# Patient Record
Sex: FEMALE | Race: WHITE | NOT HISPANIC OR LATINO | Employment: OTHER | URBAN - METROPOLITAN AREA
[De-identification: names, ages, dates, MRNs, and addresses within clinical notes are randomized per-mention and may not be internally consistent; named-entity substitution may affect disease eponyms.]

---

## 2020-07-21 ENCOUNTER — OFFICE VISIT (OUTPATIENT)
Dept: FAMILY MEDICINE CLINIC | Facility: CLINIC | Age: 75
End: 2020-07-21

## 2020-07-21 VITALS
OXYGEN SATURATION: 98 % | TEMPERATURE: 95.8 F | HEART RATE: 75 BPM | SYSTOLIC BLOOD PRESSURE: 159 MMHG | WEIGHT: 123 LBS | HEIGHT: 64 IN | DIASTOLIC BLOOD PRESSURE: 78 MMHG | BODY MASS INDEX: 21 KG/M2

## 2020-07-21 DIAGNOSIS — S93.402A SPRAIN OF LEFT ANKLE, UNSPECIFIED LIGAMENT, INITIAL ENCOUNTER: ICD-10-CM

## 2020-07-21 DIAGNOSIS — B18.2 CHRONIC HEPATITIS C WITHOUT HEPATIC COMA (HCC): ICD-10-CM

## 2020-07-21 DIAGNOSIS — E03.9 ACQUIRED HYPOTHYROIDISM: ICD-10-CM

## 2020-07-21 DIAGNOSIS — I10 ESSENTIAL HYPERTENSION: Primary | ICD-10-CM

## 2020-07-21 DIAGNOSIS — E78.2 MIXED HYPERLIPIDEMIA: ICD-10-CM

## 2020-07-21 PROBLEM — R73.9 HYPERGLYCEMIA: Status: ACTIVE | Noted: 2020-07-21

## 2020-07-21 PROBLEM — M81.0 OSTEOPOROSIS: Status: ACTIVE | Noted: 2020-07-21

## 2020-07-21 PROBLEM — E87.5 HYPERKALEMIA: Status: ACTIVE | Noted: 2020-07-21

## 2020-07-21 PROBLEM — F17.200 SMOKER: Status: ACTIVE | Noted: 2020-07-21

## 2020-07-21 PROBLEM — E78.5 HYPERLIPEMIA: Status: ACTIVE | Noted: 2020-07-21

## 2020-07-21 PROBLEM — N63.0 BREAST LUMP: Status: ACTIVE | Noted: 2020-07-21

## 2020-07-21 PROBLEM — G47.00 INSOMNIA: Status: ACTIVE | Noted: 2020-07-21

## 2020-07-21 PROCEDURE — 99203 OFFICE O/P NEW LOW 30 MIN: CPT | Performed by: FAMILY MEDICINE

## 2020-07-21 RX ORDER — LEVOTHYROXINE SODIUM 0.05 MG/1
50 TABLET ORAL DAILY
COMMUNITY
End: 2020-07-21 | Stop reason: SDUPTHER

## 2020-07-21 RX ORDER — LEVOTHYROXINE SODIUM 0.05 MG/1
50 TABLET ORAL DAILY
Qty: 90 TABLET | Refills: 3 | Status: SHIPPED | OUTPATIENT
Start: 2020-07-21 | End: 2021-07-12 | Stop reason: SDUPTHER

## 2020-07-21 RX ORDER — METOPROLOL SUCCINATE 25 MG/1
25 TABLET, EXTENDED RELEASE ORAL DAILY
COMMUNITY
End: 2020-07-21 | Stop reason: SDUPTHER

## 2020-07-21 RX ORDER — METOPROLOL SUCCINATE 25 MG/1
25 TABLET, EXTENDED RELEASE ORAL DAILY
Qty: 90 TABLET | Refills: 3 | Status: SHIPPED | OUTPATIENT
Start: 2020-07-21 | End: 2021-07-12 | Stop reason: SDUPTHER

## 2020-07-21 NOTE — PROGRESS NOTES
Subjective   Dorie Moctezuma is a 74 y.o. female.   Chief Complaint   Patient presents with   • Hypertension   • Fall       History of Present Illness     Transferring from Dr. Aguirre in [place].  No records are available prior to the beginning of the visit.  She tells me several times how much she loved her previous PCP.  Eventually, I put together that it is a bit difficult for her to travel to his office.  Patient presents to establish care, needing refills on bp and thyroid medication. Patient states she does not check her bp at home because she cannot find a cuff that gets a good reading.    No records from prior PCP.    Thinks last labs were about 1 year ago.      Last mammo was maybe a year ago.      B/P always a little high at [facility] Visit.      Patient states she fell last night and c/o left ankle pain. Patient states it swells off and on.  She is able to walk just fine.      Patient Active Problem List    Diagnosis Date Noted   • Hyperglycemia 07/21/2020   • Hyperkalemia 07/21/2020   • Mixed hyperlipidemia 07/21/2020   • Essential hypertension 07/21/2020   • Acquired hypothyroidism 07/21/2020   • Insomnia 07/21/2020   • Osteoporosis 07/21/2020   • Smoker 07/21/2020   • Breast lump 07/21/2020     Note Last Updated: 7/21/2020     H/o biopsy in past - was OK.     • Chronic hepatitis C without hepatic coma (CMS/HCC) 07/21/2020     Note Last Updated: 7/21/2020     Diagnosed in 1970's  Per patient - had tests that showed hepatitis A             Past Surgical History:   Procedure Laterality Date   • CHOLECYSTECTOMY       No current outpatient medications on file prior to visit.     No current facility-administered medications on file prior to visit.      Allergies   Allergen Reactions   • Benzocaine (Topical) Rash   • Latex, Natural Rubber Rash     Social History     Socioeconomic History   • Marital status: Single     Spouse name: Not on file   • Number of children: Not on file   • Years of education: Not on  "file   • Highest education level: Not on file   Tobacco Use   • Smoking status: Current Every Day Smoker   • Smokeless tobacco: Never Used     Family History   Problem Relation Age of Onset   • Other Sister         bladder cancer   • Heart failure Sister    • Crohn's disease Sister    • Colon cancer Sister    • Colon cancer Brother    • No Known Problems Brother      The following portions of the patient's history were reviewed and updated as appropriate: allergies, current medications, past family history, past medical history, past social history, past surgical history and problem list.    Review of Systems   Constitutional: Negative for chills and fever.   Respiratory: Negative for cough, choking and wheezing.    Cardiovascular: Negative for palpitations and leg swelling.   Gastrointestinal: Negative for abdominal pain.   Neurological: Negative for headache.       Objective   /78 (BP Location: Right arm, Patient Position: Sitting, Cuff Size: Adult)   Pulse 75   Temp 95.8 °F (35.4 °C) (Infrared)   Ht 162.6 cm (64\")   Wt 55.8 kg (123 lb)   SpO2 98%   BMI 21.11 kg/m²   Physical Exam   Constitutional: She is oriented to person, place, and time. She appears well-developed and well-nourished.   HENT:   Head: Normocephalic and atraumatic.   Eyes: Conjunctivae and EOM are normal.   Neck: Normal range of motion.   Cardiovascular: Normal rate, regular rhythm and normal heart sounds.   No murmur heard.  Pulmonary/Chest: Effort normal and breath sounds normal. No respiratory distress.   Musculoskeletal: Normal range of motion.   Neurological: She is alert and oriented to person, place, and time.   Skin: Skin is warm and dry. No rash noted.   Psychiatric: Her behavior is normal. Her affect is blunt.   Seems to have a very poor knowledge of her medical problems.  Even has sort of a difficult time even tell me why she changed doctors.       Assessment/Plan   Diagnoses and all orders for this visit:    1. Essential " hypertension (Primary)  -     Cancel: Comprehensive Metabolic Panel  -     Cancel: CBC & Differential  -     Comprehensive Metabolic Panel  -     CBC & Differential    2. Mixed hyperlipidemia  -     Cancel: Lipid Panel  -     Lipid Panel  -     CBC & Differential    3. Acquired hypothyroidism  -     Cancel: TSH  -     TSH    4. Chronic hepatitis C without hepatic coma (CMS/HCC)  -     Cancel: Hepatitis panel, acute  -     Hepatitis panel, acute    5. Sprain of left ankle, unspecified ligament, initial encounter    Other orders  -     levothyroxine (SYNTHROID, LEVOTHROID) 50 MCG tablet; Take 1 tablet by mouth Daily.  Dispense: 90 tablet; Refill: 3  -     metoprolol succinate XL (TOPROL-XL) 25 MG 24 hr tablet; Take 1 tablet by mouth Daily.  Dispense: 90 tablet; Refill: 3    Will refill her current medications at current doses.  We will have to obtain prior records for baseline of her ongoing medical problems before any meaningful decisions can be made.  We will go ahead and get labs today to evaluate her hypothyroidism.  Recheck her lipid panel, get a look at her blood sugar, kidney tests, liver tests.  Finally we will repeat a hepatitis panel due to this very vague history of hepatitis.  We will also request records to determine when her last mammogram was, get labs from previous work-ups.  We will see her back in a few weeks and make any changes as appropriate.  Finally, recommend she try to stay off her left ankle as much as possible, wrap it with an Ace wrap for support, use ice if it swells.         Return in about 4 weeks (around 8/18/2020).    Call with any problems or concerns before next visit

## 2020-07-22 LAB
ALBUMIN SERPL-MCNC: 5 G/DL (ref 3.7–4.7)
ALBUMIN/GLOB SERPL: 1.9 {RATIO} (ref 1.2–2.2)
ALP SERPL-CCNC: 90 IU/L (ref 39–117)
ALT SERPL-CCNC: 12 IU/L (ref 0–32)
AST SERPL-CCNC: 23 IU/L (ref 0–40)
BASOPHILS # BLD AUTO: 0.1 X10E3/UL (ref 0–0.2)
BASOPHILS NFR BLD AUTO: 1 %
BILIRUB SERPL-MCNC: 0.9 MG/DL (ref 0–1.2)
BUN SERPL-MCNC: 6 MG/DL (ref 8–27)
BUN/CREAT SERPL: 9 (ref 12–28)
CALCIUM SERPL-MCNC: 10.2 MG/DL (ref 8.7–10.3)
CHLORIDE SERPL-SCNC: 95 MMOL/L (ref 96–106)
CHOLEST SERPL-MCNC: 247 MG/DL (ref 100–199)
CO2 SERPL-SCNC: 24 MMOL/L (ref 20–29)
CREAT SERPL-MCNC: 0.64 MG/DL (ref 0.57–1)
EOSINOPHIL # BLD AUTO: 0 X10E3/UL (ref 0–0.4)
EOSINOPHIL NFR BLD AUTO: 0 %
ERYTHROCYTE [DISTWIDTH] IN BLOOD BY AUTOMATED COUNT: 12.4 % (ref 11.7–15.4)
GLOBULIN SER CALC-MCNC: 2.6 G/DL (ref 1.5–4.5)
GLUCOSE SERPL-MCNC: 100 MG/DL (ref 65–99)
HAV IGM SERPL QL IA: NEGATIVE
HBV CORE IGM SERPL QL IA: NEGATIVE
HBV SURFACE AG SERPL QL IA: NEGATIVE
HCT VFR BLD AUTO: 43.4 % (ref 34–46.6)
HCV AB S/CO SERPL IA: <0.1 S/CO RATIO (ref 0–0.9)
HDLC SERPL-MCNC: 84 MG/DL
HGB BLD-MCNC: 14.8 G/DL (ref 11.1–15.9)
IMM GRANULOCYTES # BLD AUTO: 0 X10E3/UL (ref 0–0.1)
IMM GRANULOCYTES NFR BLD AUTO: 0 %
LDLC SERPL CALC-MCNC: 150 MG/DL (ref 0–99)
LYMPHOCYTES # BLD AUTO: 2.2 X10E3/UL (ref 0.7–3.1)
LYMPHOCYTES NFR BLD AUTO: 22 %
MCH RBC QN AUTO: 31.5 PG (ref 26.6–33)
MCHC RBC AUTO-ENTMCNC: 34.1 G/DL (ref 31.5–35.7)
MCV RBC AUTO: 92 FL (ref 79–97)
MONOCYTES # BLD AUTO: 0.7 X10E3/UL (ref 0.1–0.9)
MONOCYTES NFR BLD AUTO: 7 %
NEUTROPHILS # BLD AUTO: 7 X10E3/UL (ref 1.4–7)
NEUTROPHILS NFR BLD AUTO: 70 %
PLATELET # BLD AUTO: 266 X10E3/UL (ref 150–450)
POTASSIUM SERPL-SCNC: 4.6 MMOL/L (ref 3.5–5.2)
PROT SERPL-MCNC: 7.6 G/DL (ref 6–8.5)
RBC # BLD AUTO: 4.7 X10E6/UL (ref 3.77–5.28)
SODIUM SERPL-SCNC: 133 MMOL/L (ref 134–144)
TRIGL SERPL-MCNC: 64 MG/DL (ref 0–149)
TSH SERPL DL<=0.005 MIU/L-ACNC: 0.87 UIU/ML (ref 0.45–4.5)
VLDLC SERPL CALC-MCNC: 13 MG/DL (ref 5–40)
WBC # BLD AUTO: 10.1 X10E3/UL (ref 3.4–10.8)

## 2020-08-18 ENCOUNTER — OFFICE VISIT (OUTPATIENT)
Dept: FAMILY MEDICINE CLINIC | Facility: CLINIC | Age: 75
End: 2020-08-18

## 2020-08-18 VITALS
TEMPERATURE: 98.9 F | HEIGHT: 64 IN | BODY MASS INDEX: 20.32 KG/M2 | DIASTOLIC BLOOD PRESSURE: 82 MMHG | HEART RATE: 67 BPM | WEIGHT: 119 LBS | OXYGEN SATURATION: 97 % | SYSTOLIC BLOOD PRESSURE: 140 MMHG

## 2020-08-18 DIAGNOSIS — R63.4 WEIGHT LOSS: ICD-10-CM

## 2020-08-18 DIAGNOSIS — I10 ESSENTIAL HYPERTENSION: Primary | ICD-10-CM

## 2020-08-18 DIAGNOSIS — B18.2 CHRONIC HEPATITIS C WITHOUT HEPATIC COMA (HCC): ICD-10-CM

## 2020-08-18 DIAGNOSIS — E03.9 ACQUIRED HYPOTHYROIDISM: ICD-10-CM

## 2020-08-18 DIAGNOSIS — E78.2 MIXED HYPERLIPIDEMIA: ICD-10-CM

## 2020-08-18 PROCEDURE — 99214 OFFICE O/P EST MOD 30 MIN: CPT | Performed by: FAMILY MEDICINE

## 2020-08-18 NOTE — PROGRESS NOTES
Subjective   Dorie Moctezuma is a 75 y.o. female.   No chief complaint on file.      History of Present Illness   Presents to the office today to follow-up on a visit a few weeks ago whereby she changed her primary care to this office.  We did not have any records at that time.  I did some labs.  Her viral hepatitis panel was negative leading me to question whether she ever had hepatitis C.  CMP showed a glucose of 100, sodium a little low at 133.  LFTs were normal.  Lipid panel showed a total cholesterol of 247 and an LDL of 150, TSH was normal at 0.87.  CBC was normal.  Labs going back to 2016 received.  Hepatitis C was negative.  Multiple CBCs, CMP, TSH, lipid panel.  Chronically low sodium.  Last Mammogram-April 23, 2019-BI-RADS 1.  Done at [facility].      1 old note from prior PCP received.  This is from April 7 of this year.  Chronic problem list reads history of hepatitis B, hyperglycemia, hyperkalemia, hyperlipidemia, hypertension, hypothyroidism, insomnia, osteoporosis, personal history of cervical dysplasia, PTSD.  Says she had a colonoscopy July 25, 2012.  She is status post cholecystectomy and some type of wrist surgery.  That is all that was in the old records.  She has no idea about osteoporosis, history of cervical dysplasia.  She tells me she does not know why that would be in there.    New c/o - no appetite.  Lost another 4 pounds since last visit.   Tells me that she just does not eat during the day.  She cannot describe any particular problem that prevents her from eating such as dysphagia, choking when eating, chest pain, belly pain, postprandial nausea, postprandial diarrhea.  She just does not eat.  She tells me she gets hungry at night and eats what ever she feels like at night.  She then goes on to explain that she is a very social person and prior to the pandemic would go out to various restaurants with her friends.  She describes going to a restaurant and eating large cheeseburger  and fries and this was 1 of her favorite things.  She asks if I can recommend some type of drink to help her support her weight.      Patient Active Problem List    Diagnosis Date Noted   • Hyperglycemia 07/21/2020   • Hyperkalemia 07/21/2020   • Mixed hyperlipidemia 07/21/2020   • Essential hypertension 07/21/2020   • Acquired hypothyroidism 07/21/2020   • Insomnia 07/21/2020   • Osteoporosis 07/21/2020   • Smoker 07/21/2020   • Breast lump 07/21/2020     Note Last Updated: 7/21/2020     H/o biopsy in past - was OK.     • Chronic hepatitis C without hepatic coma (CMS/HCC) 07/21/2020     Note Last Updated: 7/21/2020     Diagnosed in 1970's  Per patient - had tests that showed hepatitis A             Past Surgical History:   Procedure Laterality Date   • CHOLECYSTECTOMY       Current Outpatient Medications on File Prior to Visit   Medication Sig   • levothyroxine (SYNTHROID, LEVOTHROID) 50 MCG tablet Take 1 tablet by mouth Daily.   • metoprolol succinate XL (TOPROL-XL) 25 MG 24 hr tablet Take 1 tablet by mouth Daily.     No current facility-administered medications on file prior to visit.      Allergies   Allergen Reactions   • Benzocaine (Topical) Rash   • Latex, Natural Rubber Rash     Social History     Socioeconomic History   • Marital status: Single     Spouse name: Not on file   • Number of children: Not on file   • Years of education: Not on file   • Highest education level: Not on file   Tobacco Use   • Smoking status: Current Every Day Smoker   • Smokeless tobacco: Never Used     Family History   Problem Relation Age of Onset   • Other Sister         bladder cancer   • Heart failure Sister    • Crohn's disease Sister    • Colon cancer Sister    • Colon cancer Brother    • No Known Problems Brother      The following portions of the patient's history were reviewed and updated as appropriate: allergies, current medications, past family history, past medical history, past social history, past surgical history  "and problem list.    Review of Systems   Constitutional: Negative for chills and fever.   Respiratory: Negative for cough, shortness of breath and wheezing.    Cardiovascular: Negative for chest pain, palpitations and leg swelling.   Gastrointestinal: Negative for abdominal pain, nausea, vomiting, GERD and indigestion.   Endocrine: Negative for polydipsia, polyphagia and polyuria.   Genitourinary: Negative for dysuria.   Neurological: Negative for dizziness and headache.   Psychiatric/Behavioral: The patient is not nervous/anxious.        Objective   /82 (BP Location: Left arm, Patient Position: Sitting, Cuff Size: Adult)   Pulse 67   Temp 98.9 °F (37.2 °C) (Infrared)   Ht 162.6 cm (64\")   Wt 54 kg (119 lb)   SpO2 97%   BMI 20.43 kg/m²   Physical Exam   Constitutional: She is oriented to person, place, and time. She appears well-developed and well-nourished.   Wearing a face mask     HENT:   Head: Normocephalic and atraumatic.   Eyes: Conjunctivae and EOM are normal.   Neck: Normal range of motion.   Cardiovascular: Normal rate, regular rhythm and normal heart sounds.   No murmur heard.  Pulmonary/Chest: Effort normal and breath sounds normal. No respiratory distress.   Musculoskeletal: Normal range of motion.   Neurological: She is alert and oriented to person, place, and time.   Skin: Skin is warm and dry. No rash noted.   Psychiatric: She has a normal mood and affect. Her behavior is normal.         Office Visit on 07/21/2020   Component Date Value Ref Range Status   • TSH 07/21/2020 0.872  0.450 - 4.500 uIU/mL Final   • Total Cholesterol 07/21/2020 247* 100 - 199 mg/dL Final   • Triglycerides 07/21/2020 64  0 - 149 mg/dL Final   • HDL Cholesterol 07/21/2020 84  >39 mg/dL Final   • VLDL Cholesterol 07/21/2020 13  5 - 40 mg/dL Final   • LDL Cholesterol  07/21/2020 150* 0 - 99 mg/dL Final   • Glucose 07/21/2020 100* 65 - 99 mg/dL Final   • BUN 07/21/2020 6* 8 - 27 mg/dL Final   • Creatinine 07/21/2020 " 0.64  0.57 - 1.00 mg/dL Final   • eGFR Non  Am 07/21/2020 87  >59 mL/min/1.73 Final   • eGFR African Am 07/21/2020 100  >59 mL/min/1.73 Final   • BUN/Creatinine Ratio 07/21/2020 9* 12 - 28 Final   • Sodium 07/21/2020 133* 134 - 144 mmol/L Final   • Potassium 07/21/2020 4.6  3.5 - 5.2 mmol/L Final   • Chloride 07/21/2020 95* 96 - 106 mmol/L Final   • Total CO2 07/21/2020 24  20 - 29 mmol/L Final   • Calcium 07/21/2020 10.2  8.7 - 10.3 mg/dL Final   • Total Protein 07/21/2020 7.6  6.0 - 8.5 g/dL Final   • Albumin 07/21/2020 5.0* 3.7 - 4.7 g/dL Final   • Globulin 07/21/2020 2.6  1.5 - 4.5 g/dL Final   • A/G Ratio 07/21/2020 1.9  1.2 - 2.2 Final   • Total Bilirubin 07/21/2020 0.9  0.0 - 1.2 mg/dL Final   • Alkaline Phosphatase 07/21/2020 90  39 - 117 IU/L Final   • AST (SGOT) 07/21/2020 23  0 - 40 IU/L Final   • ALT (SGPT) 07/21/2020 12  0 - 32 IU/L Final   • WBC 07/21/2020 10.1  3.4 - 10.8 x10E3/uL Final   • RBC 07/21/2020 4.70  3.77 - 5.28 x10E6/uL Final   • Hemoglobin 07/21/2020 14.8  11.1 - 15.9 g/dL Final   • Hematocrit 07/21/2020 43.4  34.0 - 46.6 % Final   • MCV 07/21/2020 92  79 - 97 fL Final   • MCH 07/21/2020 31.5  26.6 - 33.0 pg Final   • MCHC 07/21/2020 34.1  31.5 - 35.7 g/dL Final   • RDW 07/21/2020 12.4  11.7 - 15.4 % Final   • Platelets 07/21/2020 266  150 - 450 x10E3/uL Final   • Neutrophil Rel % 07/21/2020 70  Not Estab. % Final   • Lymphocyte Rel % 07/21/2020 22  Not Estab. % Final   • Monocyte Rel % 07/21/2020 7  Not Estab. % Final   • Eosinophil Rel % 07/21/2020 0  Not Estab. % Final   • Basophil Rel % 07/21/2020 1  Not Estab. % Final   • Neutrophils Absolute 07/21/2020 7.0  1.4 - 7.0 x10E3/uL Final   • Lymphocytes Absolute 07/21/2020 2.2  0.7 - 3.1 x10E3/uL Final   • Monocytes Absolute 07/21/2020 0.7  0.1 - 0.9 x10E3/uL Final   • Eosinophils Absolute 07/21/2020 0.0  0.0 - 0.4 x10E3/uL Final   • Basophils Absolute 07/21/2020 0.1  0.0 - 0.2 x10E3/uL Final   • Immature Granulocyte Rel %  07/21/2020 0  Not Estab. % Final   • Immature Grans Absolute 07/21/2020 0.0  0.0 - 0.1 x10E3/uL Final   • Hep A IgM 07/21/2020 Negative  Negative Final   • Hepatitis B Surface Ag 07/21/2020 Negative  Negative Final   • Hep B Core IgM 07/21/2020 Negative  Negative Final   • Hep C Virus Ab 07/21/2020 <0.1  0.0 - 0.9 s/co ratio Final    Comment:                                   Negative:     < 0.8                               Indeterminate: 0.8 - 0.9                                    Positive:     > 0.9   The CDC recommends that a positive HCV antibody result   be followed up with a HCV Nucleic Acid Amplification   test (119159).           Assessment/Plan   Diagnoses and all orders for this visit:    1. Essential hypertension (Primary)    2. Mixed hyperlipidemia    3. Chronic hepatitis C without hepatic coma (CMS/HCC)    4. Acquired hypothyroidism    5. Weight loss    Her blood pressure seems fairly well controlled.  Cholesterol is elevated.  She is not on a statin.  At 75, it is questionable as to whether that is even helpful.  Hepatitis panel is negative, questioning her history of hepatitis B.  Finally her thyroid is balanced on her current labs.  Recommend Boost or Ensure TID.    If her weight does not stabilize, we can consider some type of appetite stimulating medication.  I do wonder if she is getting a bit depressed because she has been isolated from her friends.  I did recommend we repeat her mammogram, but she does not want to do that now.  She tells me that they always see something and it is always fine and they always want her to come back and do more tests and she just does not want to do that right now.  I asked her to come back soon so I could recheck her weight.  She tells me she is not used to coming to the doctor so often.  She finally agreed to come back in 3 months.  If her weight is stable and she is feeling okay, I think it is fine to cut her appointments back to once or twice a year.  We will  see if we can round up some records to fill in any details about this report history of osteoporosis.           Return in about 3 months (around 11/18/2020).    Call with any problems or concerns before next visit

## 2020-09-02 ENCOUNTER — TELEPHONE (OUTPATIENT)
Dept: FAMILY MEDICINE CLINIC | Facility: CLINIC | Age: 75
End: 2020-09-02

## 2020-09-02 NOTE — TELEPHONE ENCOUNTER
Yes, she can take an over-the-counter antihistamine.  I would recommend Allegra 60 mg, 1 pill twice daily for as long as she is having the stuffy and runny nose.  Thanks

## 2020-09-02 NOTE — TELEPHONE ENCOUNTER
Patient called wanting to know if its okay to take otc allergy medication said she is experiencing  stuffy/runny nose or if  could send something in

## 2020-11-18 ENCOUNTER — OFFICE VISIT (OUTPATIENT)
Dept: FAMILY MEDICINE CLINIC | Facility: CLINIC | Age: 75
End: 2020-11-18

## 2020-11-18 VITALS
OXYGEN SATURATION: 98 % | WEIGHT: 120.8 LBS | BODY MASS INDEX: 20.62 KG/M2 | HEIGHT: 64 IN | TEMPERATURE: 97.1 F | SYSTOLIC BLOOD PRESSURE: 165 MMHG | DIASTOLIC BLOOD PRESSURE: 90 MMHG | HEART RATE: 64 BPM

## 2020-11-18 DIAGNOSIS — R63.4 WEIGHT LOSS: ICD-10-CM

## 2020-11-18 DIAGNOSIS — F41.1 GENERALIZED ANXIETY DISORDER: ICD-10-CM

## 2020-11-18 DIAGNOSIS — I10 ESSENTIAL HYPERTENSION: Primary | ICD-10-CM

## 2020-11-18 PROCEDURE — 99213 OFFICE O/P EST LOW 20 MIN: CPT | Performed by: FAMILY MEDICINE

## 2020-11-18 NOTE — PROGRESS NOTES
Subjective   Dorie Moctezuma is a 75 y.o. female.   Chief Complaint   Patient presents with   • Hypertension   • Weight Loss       History of Present Illness   Patient here today to follow up for hypertension and weight loss. She doesn't take her blood pressure at home, she states that would make her nervous. She states she has to make herself eat, she doesn't have much appetite. She is concerned about what time of day she takes her meds, wants to talk to doctor about that.   Above reviewed and verified.  Presents today to primarily recheck on weight loss.  Again, she has got questions about when to take her medicines.  She only takes metoprolol and Synthroid.  Her last TSH was in July and it was 0.872.  Other labs were discussed with her at the previous visit.  Her blood sugar was 100.  She does not have a history of diabetes.  She complains of ongoing weight loss.  She is 120, nearly 121 pounds today.  She was 119 pounds in August.  She reports that she has does not have an appetite.  She acknowledges that she does not eat and that she has not lost any more weight.  She denies any nausea or vomiting.  She is very anxious.  She does not like anything that is happening in the world right now.        Patient Active Problem List    Diagnosis Date Noted   • Hyperglycemia 07/21/2020   • Hyperkalemia 07/21/2020   • Mixed hyperlipidemia 07/21/2020   • Essential hypertension 07/21/2020   • Acquired hypothyroidism 07/21/2020   • Insomnia 07/21/2020   • Osteoporosis 07/21/2020   • Smoker 07/21/2020   • Breast lump 07/21/2020     Note Last Updated: 7/21/2020     H/o biopsy in past - was OK.     • Chronic hepatitis C without hepatic coma (CMS/HCC) 07/21/2020     Note Last Updated: 7/21/2020     Diagnosed in 1970's  Per patient - had tests that showed hepatitis A             Past Surgical History:   Procedure Laterality Date   • CHOLECYSTECTOMY       Current Outpatient Medications on File Prior to Visit   Medication Sig   •  "levothyroxine (SYNTHROID, LEVOTHROID) 50 MCG tablet Take 1 tablet by mouth Daily.   • metoprolol succinate XL (TOPROL-XL) 25 MG 24 hr tablet Take 1 tablet by mouth Daily.     No current facility-administered medications on file prior to visit.      Allergies   Allergen Reactions   • Benzocaine (Topical) Rash   • Latex, Natural Rubber Rash     Social History     Socioeconomic History   • Marital status: Single     Spouse name: Not on file   • Number of children: Not on file   • Years of education: Not on file   • Highest education level: Not on file   Tobacco Use   • Smoking status: Current Every Day Smoker   • Smokeless tobacco: Never Used     Family History   Problem Relation Age of Onset   • Other Sister         bladder cancer   • Heart failure Sister    • Crohn's disease Sister    • Colon cancer Sister    • Colon cancer Brother    • No Known Problems Brother      The following portions of the patient's history were reviewed and updated as appropriate: allergies, current medications, past family history, past medical history, past social history, past surgical history and problem list.    Review of Systems   Constitutional: Negative for chills and fever.   Respiratory: Negative for cough.        Objective   /90 (BP Location: Left arm, Patient Position: Sitting, Cuff Size: Adult)   Pulse 64   Temp 97.1 °F (36.2 °C) (Infrared)   Ht 162.6 cm (64.02\")   Wt 54.8 kg (120 lb 12.8 oz)   SpO2 98%   BMI 20.72 kg/m²   Physical Exam  Constitutional:       Appearance: She is well-developed.      Comments: Wearing a face mask  Thin, small statured elderly white female.  Walking around the halls waiting for me to come see her.   HENT:      Head: Normocephalic and atraumatic.   Eyes:      Conjunctiva/sclera: Conjunctivae normal.   Neck:      Musculoskeletal: Normal range of motion.   Cardiovascular:      Rate and Rhythm: Normal rate.   Pulmonary:      Effort: Pulmonary effort is normal.   Musculoskeletal: Normal range " of motion.   Skin:     General: Skin is warm and dry.      Findings: No rash.   Neurological:      Mental Status: She is alert and oriented to person, place, and time.   Psychiatric:         Mood and Affect: Mood is anxious.         Behavior: Behavior normal.      Comments: Worries about everything         Assessment/Plan   Diagnoses and all orders for this visit:    1. Essential hypertension (Primary)    2. Generalized anxiety disorder    3. Weight loss  Comments:  reported - not seen by office weight checks    Her weight is actually stable to up a little bit today.  She declines to make any adjustments to her blood pressure medications at the present time.  I answered her questions about when she could take her metoprolol and Synthroid.  I offered to give her something to begin treating her anxiety, she did not want to take any more pills.  She did not want to do any other tests.  She did agree to come back for follow-up in the spring time.  She tells me she will agree to do her mammogram then.  Also see if we can get her to do a DEXA scan and maybe talk about some vaccines.  She appears to be in a chronic stable state.         Return in about 6 months (around 5/18/2021).    Call with any problems or concerns before next visit

## 2020-11-24 ENCOUNTER — TELEPHONE (OUTPATIENT)
Dept: FAMILY MEDICINE CLINIC | Facility: CLINIC | Age: 75
End: 2020-11-24

## 2020-11-24 NOTE — TELEPHONE ENCOUNTER
Patient called to ask if she could take an over the counter Anamika supplement. She just wants to make sure it won't interfere with her prescribed medications and that it would be safe to take. Please advise patient.

## 2020-11-25 NOTE — TELEPHONE ENCOUNTER
Please tell Dorie tomorrow that yes, she can certainly take a zinc supplement.  It should not interfere with any of her other medications.  Thanks

## 2021-05-19 ENCOUNTER — OFFICE VISIT (OUTPATIENT)
Dept: FAMILY MEDICINE CLINIC | Facility: CLINIC | Age: 76
End: 2021-05-19

## 2021-05-19 VITALS
OXYGEN SATURATION: 98 % | DIASTOLIC BLOOD PRESSURE: 80 MMHG | BODY MASS INDEX: 21.51 KG/M2 | HEART RATE: 63 BPM | WEIGHT: 126 LBS | TEMPERATURE: 97.1 F | SYSTOLIC BLOOD PRESSURE: 154 MMHG | HEIGHT: 64 IN

## 2021-05-19 DIAGNOSIS — E78.2 MIXED HYPERLIPIDEMIA: ICD-10-CM

## 2021-05-19 DIAGNOSIS — R73.9 HYPERGLYCEMIA: ICD-10-CM

## 2021-05-19 DIAGNOSIS — E03.9 ACQUIRED HYPOTHYROIDISM: Primary | ICD-10-CM

## 2021-05-19 DIAGNOSIS — I10 ESSENTIAL HYPERTENSION: ICD-10-CM

## 2021-05-19 DIAGNOSIS — M81.0 OSTEOPOROSIS, UNSPECIFIED OSTEOPOROSIS TYPE, UNSPECIFIED PATHOLOGICAL FRACTURE PRESENCE: ICD-10-CM

## 2021-05-19 PROCEDURE — 99214 OFFICE O/P EST MOD 30 MIN: CPT | Performed by: FAMILY MEDICINE

## 2021-05-20 LAB
ALBUMIN SERPL-MCNC: 4.3 G/DL (ref 3.7–4.7)
ALBUMIN/GLOB SERPL: 1.5 {RATIO} (ref 1.2–2.2)
ALP SERPL-CCNC: 78 IU/L (ref 48–121)
ALT SERPL-CCNC: 17 IU/L (ref 0–32)
AST SERPL-CCNC: 20 IU/L (ref 0–40)
BASOPHILS # BLD AUTO: 0.1 X10E3/UL (ref 0–0.2)
BASOPHILS NFR BLD AUTO: 1 %
BILIRUB SERPL-MCNC: 0.4 MG/DL (ref 0–1.2)
BUN SERPL-MCNC: 7 MG/DL (ref 8–27)
BUN/CREAT SERPL: 12 (ref 12–28)
CALCIUM SERPL-MCNC: 9.7 MG/DL (ref 8.7–10.3)
CHLORIDE SERPL-SCNC: 100 MMOL/L (ref 96–106)
CHOLEST SERPL-MCNC: 236 MG/DL (ref 100–199)
CO2 SERPL-SCNC: 27 MMOL/L (ref 20–29)
CREAT SERPL-MCNC: 0.6 MG/DL (ref 0.57–1)
EOSINOPHIL # BLD AUTO: 0.1 X10E3/UL (ref 0–0.4)
EOSINOPHIL NFR BLD AUTO: 1 %
ERYTHROCYTE [DISTWIDTH] IN BLOOD BY AUTOMATED COUNT: 12.6 % (ref 11.7–15.4)
GLOBULIN SER CALC-MCNC: 2.8 G/DL (ref 1.5–4.5)
GLUCOSE SERPL-MCNC: 93 MG/DL (ref 65–99)
HBA1C MFR BLD: 5.5 % (ref 4.8–5.6)
HCT VFR BLD AUTO: 45.3 % (ref 34–46.6)
HDLC SERPL-MCNC: 81 MG/DL
HGB BLD-MCNC: 14.8 G/DL (ref 11.1–15.9)
IMM GRANULOCYTES # BLD AUTO: 0 X10E3/UL (ref 0–0.1)
IMM GRANULOCYTES NFR BLD AUTO: 0 %
LDLC SERPL CALC-MCNC: 137 MG/DL (ref 0–99)
LYMPHOCYTES # BLD AUTO: 2.3 X10E3/UL (ref 0.7–3.1)
LYMPHOCYTES NFR BLD AUTO: 37 %
MCH RBC QN AUTO: 30.8 PG (ref 26.6–33)
MCHC RBC AUTO-ENTMCNC: 32.7 G/DL (ref 31.5–35.7)
MCV RBC AUTO: 94 FL (ref 79–97)
MONOCYTES # BLD AUTO: 0.3 X10E3/UL (ref 0.1–0.9)
MONOCYTES NFR BLD AUTO: 5 %
NEUTROPHILS # BLD AUTO: 3.3 X10E3/UL (ref 1.4–7)
NEUTROPHILS NFR BLD AUTO: 56 %
PLATELET # BLD AUTO: 285 X10E3/UL (ref 150–450)
POTASSIUM SERPL-SCNC: 5.3 MMOL/L (ref 3.5–5.2)
PROT SERPL-MCNC: 7.1 G/DL (ref 6–8.5)
RBC # BLD AUTO: 4.8 X10E6/UL (ref 3.77–5.28)
SODIUM SERPL-SCNC: 139 MMOL/L (ref 134–144)
TRIGL SERPL-MCNC: 102 MG/DL (ref 0–149)
TSH SERPL DL<=0.005 MIU/L-ACNC: 1.11 UIU/ML (ref 0.45–4.5)
VLDLC SERPL CALC-MCNC: 18 MG/DL (ref 5–40)
WBC # BLD AUTO: 6.1 X10E3/UL (ref 3.4–10.8)

## 2021-06-15 ENCOUNTER — TELEPHONE (OUTPATIENT)
Dept: FAMILY MEDICINE CLINIC | Facility: CLINIC | Age: 76
End: 2021-06-15

## 2021-06-15 NOTE — TELEPHONE ENCOUNTER
PT IS REQUESTING A CALL BACK THE LEONORA, STATES SHE FORGOT TO MENTION SOMETHING IN THE PHONE CALL THIS MORNING

## 2021-06-15 NOTE — TELEPHONE ENCOUNTER
Caller: Dorie Moctezuma    Relationship to patient: Self    Best call back number: [phone]    Patient is needing: PATIENT STATES SHE IS APPLYING FOR MEDIGAP INSURANCE AND WAS DENIED DUE TO HAVING BLADDER CANCER IN THE LINING OF HER BLADDER IN 2020. PATIENT STATES SHE HAS NEVER HAD CANCER AND WANTS TO KNOW IF DR. FERNÁNDEZ CAN REVIEW HER MEDICAL RECORDS FROM 2020 WITH [facility] AND PREVIOUS PROVIDER DR. KRUEGER TO ENSURE THERE IS NO DIAGNOSIS OF BLADDER CANCER. PATIENT IS CONCERNED IT MAY BE MEDICAL FRAUD AND WANTS TO ENSURE THERE IS NO RECORD OF DIAGNOSIS BEFORE DISPUTING IT. CALLBACK REQUESTED.

## 2021-06-15 NOTE — TELEPHONE ENCOUNTER
Please tell Dorie that I have no record of her having bladder cancer.  There is nothing in Dr. Aguirre's records about this either.  On her health history form here, while she was providing family history, she seemed to indicate that either her sister or brother had bladder cancer.  May be these insurance people got a hold of that and mistook her family members history for hers?  Maybe that will help.    Thanks

## 2021-06-15 NOTE — TELEPHONE ENCOUNTER
Patient came in said that she is trying to get [place] medigap and her insurance is telling her that they got information stating she has had bladder cancer and she doesnt she says she needs a letter typed out from  stating that she doesn't have bladder cancer.If we could it needs to be faxed to number listed below    [phone]

## 2021-06-22 NOTE — TELEPHONE ENCOUNTER
I spoke with patient and she asked about that letter that Dr Garcia wrote regarding her not having bladder cancer. I confirmed with her where I sent it, I printed it and told her she should come by and pick it us so she has it in case she ever needs it. She voiced understanding.   Difficulty concentrating/Difficulty making decisions/Difficulty remembering No

## 2021-06-22 NOTE — TELEPHONE ENCOUNTER
PATIENT REQUESTING TO SPEAK WITH  ABOUT SOME PAPERS SHE WOULD LIKE TO DISCUSS FURTHER . PLEASE ADVISE.

## 2021-06-24 ENCOUNTER — TELEPHONE (OUTPATIENT)
Dept: FAMILY MEDICINE CLINIC | Facility: CLINIC | Age: 76
End: 2021-06-24

## 2021-06-24 NOTE — TELEPHONE ENCOUNTER
PATIENT IS CALLING ABOUT [employer]-SAYS IT WAS MISSING SOME INFORMATION. THEY HAVE DENIED HER [employer].    PLEASE ADVISE  [phone]

## 2021-06-25 NOTE — TELEPHONE ENCOUNTER
I advised pt that I resent that paperwork to her [facility] ins co this morning with her member number on it. She voiced understanding.

## 2021-07-12 RX ORDER — LEVOTHYROXINE SODIUM 0.05 MG/1
50 TABLET ORAL DAILY
Qty: 90 TABLET | Refills: 3 | Status: SHIPPED | OUTPATIENT
Start: 2021-07-12 | End: 2022-05-04 | Stop reason: SDUPTHER

## 2021-07-12 RX ORDER — METOPROLOL SUCCINATE 25 MG/1
25 TABLET, EXTENDED RELEASE ORAL DAILY
Qty: 90 TABLET | Refills: 3 | Status: SHIPPED | OUTPATIENT
Start: 2021-07-12 | End: 2022-05-04 | Stop reason: SDUPTHER

## 2021-07-12 NOTE — TELEPHONE ENCOUNTER
Caller: Dorie Moctezuma    Relationship: Self    Best call back number: [phone]     Medication needed:   Requested Prescriptions     Pending Prescriptions Disp Refills   • levothyroxine (SYNTHROID, LEVOTHROID) 50 MCG tablet 90 tablet 3     Sig: Take 1 tablet by mouth Daily.   • metoprolol succinate XL (TOPROL-XL) 25 MG 24 hr tablet 90 tablet 3     Sig: Take 1 tablet by mouth Daily.       When do you need the refill by:ASAP  What additional details did the patient provide when requesting the medication: SHE WILL RUN OUT ON 7/14/21    Does the patient have less than a 3 day supply:  [x] Yes  [] No    What is the patient's preferred pharmacy: [facility] [address] [place] [address] [address] [place], [place] [phone] [place] [phone]

## 2021-07-28 ENCOUNTER — OFFICE VISIT (OUTPATIENT)
Dept: FAMILY MEDICINE CLINIC | Facility: CLINIC | Age: 76
End: 2021-07-28

## 2021-07-28 VITALS
HEART RATE: 63 BPM | OXYGEN SATURATION: 97 % | TEMPERATURE: 98 F | WEIGHT: 119 LBS | HEIGHT: 64 IN | DIASTOLIC BLOOD PRESSURE: 78 MMHG | BODY MASS INDEX: 20.32 KG/M2 | SYSTOLIC BLOOD PRESSURE: 116 MMHG

## 2021-07-28 DIAGNOSIS — I10 ESSENTIAL HYPERTENSION: ICD-10-CM

## 2021-07-28 DIAGNOSIS — R06.02 SOB (SHORTNESS OF BREATH): Primary | ICD-10-CM

## 2021-07-28 DIAGNOSIS — E03.9 ACQUIRED HYPOTHYROIDISM: ICD-10-CM

## 2021-07-28 PROCEDURE — 99214 OFFICE O/P EST MOD 30 MIN: CPT | Performed by: FAMILY MEDICINE

## 2021-07-28 NOTE — PROGRESS NOTES
Subjective   Dorie Moctezuma is a 75 y.o. female.   Chief Complaint   Patient presents with   • Shortness of Breath       History of Present Illness   Patient here today to follow up on her emergency room visit. She went to ER for SOA. They told her she has low sodium and potasium. We don't have lab result, Ill send request over for labs.  Above reviewed and verified    Went to ER 7/23/21 for SOA.  Sudden onset.  Reports she gets SOA a lot at home when she's anxious / nervous (which happens a lot).  She worries about her daughter who has OCD and Dorie has been anxious herself most times that I have seen her.  I do not have any records to look at.  It sounds like a chest x-ray was not done.  She tells me she does not remember having 1.  She also tells me she does not know if they javy her blood but somehow diagnosed her with low potassium.    Doesn't eat much - no appetite.  (sweets only)      Patient Active Problem List    Diagnosis Date Noted   • Hyperglycemia 07/21/2020   • Hyperkalemia 07/21/2020   • Mixed hyperlipidemia 07/21/2020   • Essential hypertension 07/21/2020   • Acquired hypothyroidism 07/21/2020   • Insomnia 07/21/2020   • Osteoporosis 07/21/2020   • Smoker 07/21/2020   • Breast lump 07/21/2020     Note Last Updated: 7/21/2020     H/o biopsy in past - was OK.     • Chronic hepatitis C without hepatic coma (CMS/HCC) 07/21/2020     Note Last Updated: 7/21/2020     Diagnosed in 1970's  Per patient - had tests that showed hepatitis A             Past Surgical History:   Procedure Laterality Date   • CHOLECYSTECTOMY       Current Outpatient Medications on File Prior to Visit   Medication Sig   • Garlic (GARLIQUE PO) Take  by mouth.   • levothyroxine (SYNTHROID, LEVOTHROID) 50 MCG tablet Take 1 tablet by mouth Daily.   • metoprolol succinate XL (TOPROL-XL) 25 MG 24 hr tablet Take 1 tablet by mouth Daily.     No current facility-administered medications on file prior to visit.     Allergies   Allergen  "Reactions   • Benzocaine (Topical) Rash   • Latex, Natural Rubber Rash     Social History     Socioeconomic History   • Marital status: Single     Spouse name: Not on file   • Number of children: Not on file   • Years of education: Not on file   • Highest education level: Not on file   Tobacco Use   • Smoking status: Current Every Day Smoker   • Smokeless tobacco: Never Used     Family History   Problem Relation Age of Onset   • Other Sister         bladder cancer   • Heart failure Sister    • Crohn's disease Sister    • Colon cancer Sister    • Colon cancer Brother    • No Known Problems Brother        Review of Systems    Objective   /78 (BP Location: Left arm, Patient Position: Sitting, Cuff Size: Adult)   Pulse 63   Temp 98 °F (36.7 °C) (Oral)   Ht 162.6 cm (64.02\")   Wt 54 kg (119 lb)   SpO2 97%   BMI 20.42 kg/m²   Physical Exam  Constitutional:       General: She is not in acute distress.     Appearance: She is well-developed.      Comments: Wearing a face mask  Thin, white female   HENT:      Head: Normocephalic and atraumatic.   Eyes:      Conjunctiva/sclera: Conjunctivae normal.   Cardiovascular:      Rate and Rhythm: Normal rate and regular rhythm.      Heart sounds: Normal heart sounds. No murmur heard.     Pulmonary:      Effort: Pulmonary effort is normal. No respiratory distress.      Breath sounds: Normal breath sounds.   Musculoskeletal:         General: Normal range of motion.      Cervical back: Normal range of motion.   Skin:     General: Skin is warm and dry.      Findings: No rash.   Neurological:      Mental Status: She is alert and oriented to person, place, and time.   Psychiatric:         Mood and Affect: Mood is anxious.         Speech: Speech is rapid and pressured.         Behavior: Behavior normal.         Cognition and Memory: Cognition is impaired. Memory is impaired.                 Assessment/Plan   Diagnoses and all orders for this visit:    1. SOB (shortness of breath) " (Primary)  -     XR Chest PA & Lateral    2. Acquired hypothyroidism  -     TSH    3. Essential hypertension  -     CBC & Differential  -     Comprehensive Metabolic Panel    She has some clear, known anxiety.  She is not short of breath now.  Her exam is normal I would like to get some labs including electrolytes and thyroid function test and a chest x-ray.  I will follow-up with her when these results are back and make any changes as appropriate.             Call with any problems or concerns before next visit  No follow-ups on file.      Much of this report is an electronic transcription of spoken language to printed text using Dragon dictation software.  As such, the subtleties and finesse of spoken language may permit erroneous, or at times, nonsensical words or phrases to be inadvertently transcribed; thus changes may be made at a later date to rectify these errors.

## 2021-07-29 LAB
ALBUMIN SERPL-MCNC: 4.5 G/DL (ref 3.7–4.7)
ALBUMIN/GLOB SERPL: 1.9 {RATIO} (ref 1.2–2.2)
ALP SERPL-CCNC: 79 IU/L (ref 48–121)
ALT SERPL-CCNC: 15 IU/L (ref 0–32)
AST SERPL-CCNC: 20 IU/L (ref 0–40)
BASOPHILS # BLD AUTO: 0.1 X10E3/UL (ref 0–0.2)
BASOPHILS NFR BLD AUTO: 1 %
BILIRUB SERPL-MCNC: 0.6 MG/DL (ref 0–1.2)
BUN SERPL-MCNC: 9 MG/DL (ref 8–27)
BUN/CREAT SERPL: 14 (ref 12–28)
CALCIUM SERPL-MCNC: 10.1 MG/DL (ref 8.7–10.3)
CHLORIDE SERPL-SCNC: 91 MMOL/L (ref 96–106)
CO2 SERPL-SCNC: 23 MMOL/L (ref 20–29)
CREAT SERPL-MCNC: 0.66 MG/DL (ref 0.57–1)
EOSINOPHIL # BLD AUTO: 0.1 X10E3/UL (ref 0–0.4)
EOSINOPHIL NFR BLD AUTO: 1 %
ERYTHROCYTE [DISTWIDTH] IN BLOOD BY AUTOMATED COUNT: 12.1 % (ref 11.7–15.4)
GLOBULIN SER CALC-MCNC: 2.4 G/DL (ref 1.5–4.5)
GLUCOSE SERPL-MCNC: 96 MG/DL (ref 65–99)
HCT VFR BLD AUTO: 43.5 % (ref 34–46.6)
HGB BLD-MCNC: 14.2 G/DL (ref 11.1–15.9)
IMM GRANULOCYTES # BLD AUTO: 0 X10E3/UL (ref 0–0.1)
IMM GRANULOCYTES NFR BLD AUTO: 0 %
LYMPHOCYTES # BLD AUTO: 1.8 X10E3/UL (ref 0.7–3.1)
LYMPHOCYTES NFR BLD AUTO: 25 %
MCH RBC QN AUTO: 30.1 PG (ref 26.6–33)
MCHC RBC AUTO-ENTMCNC: 32.6 G/DL (ref 31.5–35.7)
MCV RBC AUTO: 92 FL (ref 79–97)
MONOCYTES # BLD AUTO: 0.5 X10E3/UL (ref 0.1–0.9)
MONOCYTES NFR BLD AUTO: 7 %
NEUTROPHILS # BLD AUTO: 4.9 X10E3/UL (ref 1.4–7)
NEUTROPHILS NFR BLD AUTO: 66 %
PLATELET # BLD AUTO: 301 X10E3/UL (ref 150–450)
POTASSIUM SERPL-SCNC: 5.2 MMOL/L (ref 3.5–5.2)
PROT SERPL-MCNC: 6.9 G/DL (ref 6–8.5)
RBC # BLD AUTO: 4.71 X10E6/UL (ref 3.77–5.28)
SODIUM SERPL-SCNC: 129 MMOL/L (ref 134–144)
TSH SERPL DL<=0.005 MIU/L-ACNC: 1.23 UIU/ML (ref 0.45–4.5)
WBC # BLD AUTO: 7.3 X10E3/UL (ref 3.4–10.8)

## 2021-07-30 ENCOUNTER — HOSPITAL ENCOUNTER (EMERGENCY)
Facility: HOSPITAL | Age: 76
Discharge: HOME | End: 2021-07-30
Attending: EMERGENCY MEDICINE | Admitting: EMERGENCY MEDICINE

## 2021-07-30 ENCOUNTER — APPOINTMENT (OUTPATIENT)
Dept: GENERAL RADIOLOGY | Facility: HOSPITAL | Age: 76
End: 2021-07-30

## 2021-07-30 VITALS
TEMPERATURE: 97.9 F | OXYGEN SATURATION: 99 % | SYSTOLIC BLOOD PRESSURE: 166 MMHG | BODY MASS INDEX: 20.66 KG/M2 | HEART RATE: 64 BPM | DIASTOLIC BLOOD PRESSURE: 98 MMHG | RESPIRATION RATE: 16 BRPM | WEIGHT: 116.62 LBS | HEIGHT: 63 IN

## 2021-07-30 DIAGNOSIS — R06.02 SHORTNESS OF BREATH: Primary | ICD-10-CM

## 2021-07-30 LAB
ALBUMIN SERPL-MCNC: 4.8 G/DL (ref 3.5–5.2)
ALBUMIN/GLOB SERPL: 1.6 G/DL
ALP SERPL-CCNC: 89 U/L (ref 39–117)
ALT SERPL W P-5'-P-CCNC: 16 U/L (ref 1–33)
ANION GAP SERPL CALCULATED.3IONS-SCNC: 11 MMOL/L (ref 5–15)
AST SERPL-CCNC: 22 U/L (ref 1–32)
BASOPHILS # BLD AUTO: 0.1 10*3/MM3 (ref 0–0.2)
BASOPHILS NFR BLD AUTO: 1.2 % (ref 0–1.5)
BILIRUB SERPL-MCNC: 0.8 MG/DL (ref 0–1.2)
BUN SERPL-MCNC: 10 MG/DL (ref 8–23)
BUN/CREAT SERPL: 16.9 (ref 7–25)
CALCIUM SPEC-SCNC: 10.1 MG/DL (ref 8.6–10.5)
CHLORIDE SERPL-SCNC: 91 MMOL/L (ref 98–107)
CO2 SERPL-SCNC: 28 MMOL/L (ref 22–29)
CREAT SERPL-MCNC: 0.59 MG/DL (ref 0.57–1)
DEPRECATED RDW RBC AUTO: 41.1 FL (ref 37–54)
EOSINOPHIL # BLD AUTO: 0.1 10*3/MM3 (ref 0–0.4)
EOSINOPHIL NFR BLD AUTO: 0.8 % (ref 0.3–6.2)
ERYTHROCYTE [DISTWIDTH] IN BLOOD BY AUTOMATED COUNT: 13.1 % (ref 12.3–15.4)
GFR SERPL CREATININE-BSD FRML MDRD: 99 ML/MIN/1.73
GLOBULIN UR ELPH-MCNC: 3 GM/DL
GLUCOSE SERPL-MCNC: 89 MG/DL (ref 65–99)
HCT VFR BLD AUTO: 45.9 % (ref 34–46.6)
HGB BLD-MCNC: 15.9 G/DL (ref 12–15.9)
HOLD SPECIMEN: NORMAL
LYMPHOCYTES # BLD AUTO: 2.4 10*3/MM3 (ref 0.7–3.1)
LYMPHOCYTES NFR BLD AUTO: 33.8 % (ref 19.6–45.3)
MCH RBC QN AUTO: 31.8 PG (ref 26.6–33)
MCHC RBC AUTO-ENTMCNC: 34.6 G/DL (ref 31.5–35.7)
MCV RBC AUTO: 92.1 FL (ref 79–97)
MONOCYTES # BLD AUTO: 0.4 10*3/MM3 (ref 0.1–0.9)
MONOCYTES NFR BLD AUTO: 6.3 % (ref 5–12)
NEUTROPHILS NFR BLD AUTO: 4.1 10*3/MM3 (ref 1.7–7)
NEUTROPHILS NFR BLD AUTO: 57.9 % (ref 42.7–76)
NRBC BLD AUTO-RTO: 0.1 /100 WBC (ref 0–0.2)
NT-PROBNP SERPL-MCNC: 193.1 PG/ML (ref 0–1800)
PLATELET # BLD AUTO: 282 10*3/MM3 (ref 140–450)
PMV BLD AUTO: 8.6 FL (ref 6–12)
POTASSIUM SERPL-SCNC: 4.6 MMOL/L (ref 3.5–5.2)
PROT SERPL-MCNC: 7.8 G/DL (ref 6–8.5)
RBC # BLD AUTO: 4.98 10*6/MM3 (ref 3.77–5.28)
SODIUM SERPL-SCNC: 130 MMOL/L (ref 136–145)
TROPONIN T SERPL-MCNC: <0.01 NG/ML (ref 0–0.03)
WBC # BLD AUTO: 7.1 10*3/MM3 (ref 3.4–10.8)

## 2021-07-30 PROCEDURE — 83880 ASSAY OF NATRIURETIC PEPTIDE: CPT | Performed by: EMERGENCY MEDICINE

## 2021-07-30 PROCEDURE — 93005 ELECTROCARDIOGRAM TRACING: CPT | Performed by: EMERGENCY MEDICINE

## 2021-07-30 PROCEDURE — 93005 ELECTROCARDIOGRAM TRACING: CPT

## 2021-07-30 PROCEDURE — 85025 COMPLETE CBC W/AUTO DIFF WBC: CPT | Performed by: EMERGENCY MEDICINE

## 2021-07-30 PROCEDURE — 71045 X-RAY EXAM CHEST 1 VIEW: CPT

## 2021-07-30 PROCEDURE — 80053 COMPREHEN METABOLIC PANEL: CPT | Performed by: EMERGENCY MEDICINE

## 2021-07-30 PROCEDURE — 99283 EMERGENCY DEPT VISIT LOW MDM: CPT

## 2021-07-30 PROCEDURE — 84484 ASSAY OF TROPONIN QUANT: CPT | Performed by: EMERGENCY MEDICINE

## 2021-07-30 RX ORDER — SODIUM CHLORIDE 0.9 % (FLUSH) 0.9 %
10 SYRINGE (ML) INJECTION AS NEEDED
Status: DISCONTINUED | OUTPATIENT
Start: 2021-07-30 | End: 2021-07-30 | Stop reason: HOSPADM

## 2021-08-01 LAB — QT INTERVAL: 398 MS

## 2021-08-02 ENCOUNTER — TELEPHONE (OUTPATIENT)
Dept: FAMILY MEDICINE CLINIC | Facility: CLINIC | Age: 76
End: 2021-08-02

## 2021-08-02 RX ORDER — BUSPIRONE HYDROCHLORIDE 5 MG/1
5 TABLET ORAL 3 TIMES DAILY
Qty: 90 TABLET | Refills: 1 | OUTPATIENT
Start: 2021-08-02 | End: 2021-08-24

## 2021-08-02 NOTE — TELEPHONE ENCOUNTER
Well, I did not talk to her about this, but I am certainly not opposed to giving her something for her anxiety.  I still think it is a good idea for her to see the cardiologist as recommended in the ER.  Please tell her that I have sent a prescription for a medication called buspirone to the pharmacy.  She can take this up to 3 times a day for anxiety.  Thanks

## 2021-08-02 NOTE — TELEPHONE ENCOUNTER
----- Message from Radha Kan MA sent at 8/2/2021  1:36 PM EDT -----  She is under the impression you will call her later? I do not see any message about this. She is asking for a 'nerve pill' to use when she needs it. I put this message on here but not sure if it sent to you? She spoke about this like you already had this conversation with her. Not sure how to handle this.  ----- Message -----  From: Radha Sorensen MD  Sent: 8/1/2021   4:49 PM EDT  To: Radha Kan MA    Please call Dorie and let her know that the blood work we did in the office midweek last week all came back normal.  Nothing that would account for the episodes of shortness of breath she has been having.I see she went to the emergency room on the 30th and I agree with the ER doctor's recommendation that she see a cardiologist just to make sure the shortness of breath is not related to a cardiac problem.  If they have not arrange that, let us know and I will be happy to work on getting her cardiology consult.  Thanks

## 2021-08-02 NOTE — TELEPHONE ENCOUNTER
Caller: Dorie Moctezuma    Relationship: Self    Best call back number: [phone]    What is the best time to reach you: ANYTIME EXCEPT 1:30-4PM    Who are you requesting to speak with (clinical staff, provider,  specific staff member): PROVIDER    Do you know the name of the person who called:     What was the call regarding: PATIENT CALLED IN AND SAID SHE WENT TO THE ER FOR SHORTNESS OF BREATH, BUT SHE SAID THAT SHE WAS AT THE ER FOR 5 HOURS AND THEY WANTED HER TO DO A STRESS TEST. SHE SAID SHE WAS NOT GOING TO DO IT, BECAUSE SHE SAID SHE GETS EASILY EXCITED ABOUT THE POLITICS/COVID AND FEELS STRESSED. SHE SAID THE DOCTOR AT THE ER TOLD HER THAT HER SODIUM WAS LOW/ SHE SAID SHE DOES NOT EVER WANT TO GO BACK BECAUSE IT WAS DIRTY AND NO ONE HAD A MASK ON. SHE ASKED IF THE DOCTOR CAN CALL HER TO DISCUSS THIS. SHE SAID SHE DOES NOT WANT TO TAKE A NERVE PILL THAT IS ADDICTIVE. SHE SAID THE SERTRALINE WAS VERY HELPFUL FOR HER IN THE PAST.    Do you require a callback: YES

## 2021-08-09 ENCOUNTER — TELEPHONE (OUTPATIENT)
Dept: FAMILY MEDICINE CLINIC | Facility: CLINIC | Age: 76
End: 2021-08-09

## 2021-08-09 NOTE — TELEPHONE ENCOUNTER
The best options to help her in this situation is MiraLAX 17 g in 8 ounces of fluid twice daily until her bowel movements are regular.  Another option is to take 30 cc of milk of magnesia every 6 hours until her bowels start to move.  Hopefully 1 of those 2 will help her.  If she has any further problems, please let us know.  Thanks

## 2021-08-09 NOTE — TELEPHONE ENCOUNTER
Patient called wanted to talk to the nurse about if it was ok to take a laxative, she states it has been 2 weeks since a bm and laxatives make her nervous. She would like a call back

## 2021-12-20 ENCOUNTER — OFFICE VISIT (OUTPATIENT)
Dept: FAMILY MEDICINE CLINIC | Facility: CLINIC | Age: 76
End: 2021-12-20

## 2021-12-20 VITALS
TEMPERATURE: 97.8 F | WEIGHT: 122 LBS | OXYGEN SATURATION: 98 % | SYSTOLIC BLOOD PRESSURE: 118 MMHG | HEIGHT: 63 IN | BODY MASS INDEX: 21.62 KG/M2 | HEART RATE: 66 BPM | DIASTOLIC BLOOD PRESSURE: 68 MMHG

## 2021-12-20 DIAGNOSIS — I10 ESSENTIAL HYPERTENSION: Primary | ICD-10-CM

## 2021-12-20 DIAGNOSIS — R73.9 HYPERGLYCEMIA: ICD-10-CM

## 2021-12-20 DIAGNOSIS — F41.1 GENERALIZED ANXIETY DISORDER: ICD-10-CM

## 2021-12-20 DIAGNOSIS — E03.9 ACQUIRED HYPOTHYROIDISM: ICD-10-CM

## 2021-12-20 DIAGNOSIS — E78.2 MIXED HYPERLIPIDEMIA: ICD-10-CM

## 2021-12-20 PROCEDURE — 99214 OFFICE O/P EST MOD 30 MIN: CPT | Performed by: FAMILY MEDICINE

## 2021-12-20 NOTE — PROGRESS NOTES
Subjective   Dorie Moctezuma is a 76 y.o. female.   Chief Complaint   Patient presents with   • Hypothyroidism       History of Present Illness   Presents to the office today for delayed 6-month office visit following a visit in May.  I have seen her 1 time since then for an episode of shortness of breath in July.  I did some labs and everything was normal.  An ER doctor had recommended she see a cardiologist.  She declined that.  She did not continue to take it.  She went to the ER a couple days later again with shortness of breath.  They repeated her lab work and a chest x-ray which only showed chronic lung disease.  Looks like they offered the option of an observation versus discharge with cardiology follow-up.  She was referred to Dr. Roman.  I do not have any consult notes.  She has acknowledged that they recommended a stress test.  She refused to go to the cardiologist.    Last lipid panel was May of this year.  Other labs were in late July through the ER.  Last TSH on July 28 was normal at 1.23    She is extremely anxious about politics and COVID-19.  This remains unchanged.  She professes severe reactions to any vaccine.  She did take 1 Elpidio & Elpidio vaccine and was sick for 19 hours.  She declined further vaccination.    Still smokes 1/2 ppd.  No longer drinks beer.    She professes a history of severe liver disease from hepatitis back in the 70s.  Sounds like she used to drink alcohol routinely.  I do wonder if this has had some cognitive effect that has impacted her anxiety.    Patient Active Problem List    Diagnosis Date Noted   • Hyperglycemia 07/21/2020   • Hyperkalemia 07/21/2020   • Mixed hyperlipidemia 07/21/2020   • Essential hypertension 07/21/2020   • Acquired hypothyroidism 07/21/2020   • Insomnia 07/21/2020   • Osteoporosis 07/21/2020   • Smoker 07/21/2020   • Breast lump 07/21/2020     Note Last Updated: 7/21/2020     H/o biopsy in past - was OK.     • Chronic hepatitis C without  "hepatic coma (HCC) 07/21/2020     Note Last Updated: 7/21/2020     Diagnosed in 1970's  Per patient - had tests that showed hepatitis A             Past Surgical History:   Procedure Laterality Date   • CHOLECYSTECTOMY       Current Outpatient Medications on File Prior to Visit   Medication Sig   • Garlic (GARLIQUE PO) Take  by mouth.   • levothyroxine (SYNTHROID, LEVOTHROID) 50 MCG tablet Take 1 tablet by mouth Daily.   • metoprolol succinate XL (TOPROL-XL) 25 MG 24 hr tablet Take 1 tablet by mouth Daily.   • [DISCONTINUED] busPIRone (BUSPAR) 5 MG tablet Take 1 tablet by mouth 3 (Three) Times a Day.     No current facility-administered medications on file prior to visit.     Allergies   Allergen Reactions   • Benzocaine (Topical) Rash   • Latex, Natural Rubber Rash     Social History     Socioeconomic History   • Marital status: Single   Tobacco Use   • Smoking status: Current Every Day Smoker   • Smokeless tobacco: Never Used     Family History   Problem Relation Age of Onset   • Other Sister         bladder cancer   • Heart failure Sister    • Crohn's disease Sister    • Colon cancer Sister    • Colon cancer Brother    • No Known Problems Brother        Review of Systems    Objective   /68 (BP Location: Left arm, Patient Position: Sitting, Cuff Size: Adult)   Pulse 66   Temp 97.8 °F (36.6 °C) (Oral)   Ht 160 cm (62.99\")   Wt 55.3 kg (122 lb)   SpO2 98%   BMI 21.62 kg/m²   Physical Exam  Constitutional:       General: She is not in acute distress.     Appearance: She is well-developed.      Comments: Wearing a face mask  Thin, white female  Neatly groomed.   HENT:      Head: Normocephalic and atraumatic.   Eyes:      Conjunctiva/sclera: Conjunctivae normal.   Cardiovascular:      Rate and Rhythm: Normal rate and regular rhythm.      Heart sounds: Normal heart sounds. No murmur heard.      Pulmonary:      Effort: Pulmonary effort is normal. No respiratory distress.      Breath sounds: Normal breath " sounds.   Musculoskeletal:         General: Normal range of motion.      Cervical back: Normal range of motion.   Skin:     General: Skin is warm and dry.      Findings: No rash.   Neurological:      Mental Status: She is alert and oriented to person, place, and time.   Psychiatric:         Mood and Affect: Mood is anxious.         Speech: Speech is rapid and pressured.         Behavior: Behavior normal.         Cognition and Memory: Cognition is impaired. Memory is impaired.           No visits with results within 4 Month(s) from this visit.   Latest known visit with results is:   Admission on 07/30/2021, Discharged on 07/30/2021   Component Date Value Ref Range Status   • QT Interval 07/30/2021 398  ms Final   • Glucose 07/30/2021 89  65 - 99 mg/dL Final   • BUN 07/30/2021 10  8 - 23 mg/dL Final   • Creatinine 07/30/2021 0.59  0.57 - 1.00 mg/dL Final   • Sodium 07/30/2021 130* 136 - 145 mmol/L Final   • Potassium 07/30/2021 4.6  3.5 - 5.2 mmol/L Final    Slight hemolysis detected by analyzer. Results may be affected.   • Chloride 07/30/2021 91* 98 - 107 mmol/L Final   • CO2 07/30/2021 28.0  22.0 - 29.0 mmol/L Final   • Calcium 07/30/2021 10.1  8.6 - 10.5 mg/dL Final   • Total Protein 07/30/2021 7.8  6.0 - 8.5 g/dL Final   • Albumin 07/30/2021 4.80  3.50 - 5.20 g/dL Final   • ALT (SGPT) 07/30/2021 16  1 - 33 U/L Final   • AST (SGOT) 07/30/2021 22  1 - 32 U/L Final    Slight hemolysis detected by analyzer. Results may be affected.   • Alkaline Phosphatase 07/30/2021 89  39 - 117 U/L Final   • Total Bilirubin 07/30/2021 0.8  0.0 - 1.2 mg/dL Final   • eGFR Non African Amer 07/30/2021 99  >60 mL/min/1.73 Final   • Globulin 07/30/2021 3.0  gm/dL Final   • A/G Ratio 07/30/2021 1.6  g/dL Final   • BUN/Creatinine Ratio 07/30/2021 16.9  7.0 - 25.0 Final   • Anion Gap 07/30/2021 11.0  5.0 - 15.0 mmol/L Final   • proBNP 07/30/2021 193.1  0.0-1,800.0 pg/mL Final   • Troponin T 07/30/2021 <0.010  0.000 - 0.030 ng/mL Final   •  WBC 07/30/2021 7.10  3.40 - 10.80 10*3/mm3 Final   • RBC 07/30/2021 4.98  3.77 - 5.28 10*6/mm3 Final   • Hemoglobin 07/30/2021 15.9  12.0 - 15.9 g/dL Final   • Hematocrit 07/30/2021 45.9  34.0 - 46.6 % Final   • MCV 07/30/2021 92.1  79.0 - 97.0 fL Final   • MCH 07/30/2021 31.8  26.6 - 33.0 pg Final   • MCHC 07/30/2021 34.6  31.5 - 35.7 g/dL Final   • RDW 07/30/2021 13.1  12.3 - 15.4 % Final   • RDW-SD 07/30/2021 41.1  37.0 - 54.0 fl Final   • MPV 07/30/2021 8.6  6.0 - 12.0 fL Final   • Platelets 07/30/2021 282  140 - 450 10*3/mm3 Final   • Neutrophil % 07/30/2021 57.9  42.7 - 76.0 % Final   • Lymphocyte % 07/30/2021 33.8  19.6 - 45.3 % Final   • Monocyte % 07/30/2021 6.3  5.0 - 12.0 % Final   • Eosinophil % 07/30/2021 0.8  0.3 - 6.2 % Final   • Basophil % 07/30/2021 1.2  0.0 - 1.5 % Final   • Neutrophils, Absolute 07/30/2021 4.10  1.70 - 7.00 10*3/mm3 Final   • Lymphocytes, Absolute 07/30/2021 2.40  0.70 - 3.10 10*3/mm3 Final   • Monocytes, Absolute 07/30/2021 0.40  0.10 - 0.90 10*3/mm3 Final   • Eosinophils, Absolute 07/30/2021 0.10  0.00 - 0.40 10*3/mm3 Final   • Basophils, Absolute 07/30/2021 0.10  0.00 - 0.20 10*3/mm3 Final   • nRBC 07/30/2021 0.1  0.0 - 0.2 /100 WBC Final   • Extra Tube 07/30/2021 Hold for add-ons.   Final    Auto resulted.         Assessment/Plan   Diagnoses and all orders for this visit:    1. Essential hypertension (Primary)  -     Comprehensive Metabolic Panel    2. Mixed hyperlipidemia    3. Acquired hypothyroidism  -     TSH    4. Generalized anxiety disorder    5. Hyperglycemia  -     Hemoglobin A1c    Her multiple chronic medical problems are stable.  Blood pressure is excellent.  Continue metoprolol at current dose.  Check TSH to follow levels.  Will adjust Synthroid if needed.  We will do an A1c because of her history of hyperglycemia.  CMP to monitor liver function tests.  Generalized anxiety is a lifelong thing that is currently stable.  She manages this by limiting her social  interactions.  I will follow-up with her when her labs are back.  Follow-up here again in the office in 6 months.           Call with any problems or concerns before next visit  Return in about 6 months (around 6/20/2022).      Much of this report is an electronic transcription of spoken language to printed text using Dragon dictation software.  As such, the subtleties and finesse of spoken language may permit erroneous, or at times, nonsensical words or phrases to be inadvertently transcribed; thus changes may be made at a later date to rectify these errors.     Radha Sorensen [id number]/20/202115:50 EST  This note has been electronically signed

## 2021-12-21 LAB
ALBUMIN SERPL-MCNC: 4.7 G/DL (ref 3.7–4.7)
ALBUMIN/GLOB SERPL: 2 {RATIO} (ref 1.2–2.2)
ALP SERPL-CCNC: 93 IU/L (ref 44–121)
ALT SERPL-CCNC: 13 IU/L (ref 0–32)
AST SERPL-CCNC: 23 IU/L (ref 0–40)
BILIRUB SERPL-MCNC: 0.7 MG/DL (ref 0–1.2)
BUN SERPL-MCNC: 8 MG/DL (ref 8–27)
BUN/CREAT SERPL: 13 (ref 12–28)
CALCIUM SERPL-MCNC: 10 MG/DL (ref 8.7–10.3)
CHLORIDE SERPL-SCNC: 100 MMOL/L (ref 96–106)
CO2 SERPL-SCNC: 23 MMOL/L (ref 20–29)
CREAT SERPL-MCNC: 0.62 MG/DL (ref 0.57–1)
GLOBULIN SER CALC-MCNC: 2.4 G/DL (ref 1.5–4.5)
GLUCOSE SERPL-MCNC: 93 MG/DL (ref 65–99)
HBA1C MFR BLD: 5.5 % (ref 4.8–5.6)
POTASSIUM SERPL-SCNC: 4.4 MMOL/L (ref 3.5–5.2)
PROT SERPL-MCNC: 7.1 G/DL (ref 6–8.5)
SODIUM SERPL-SCNC: 137 MMOL/L (ref 134–144)
TSH SERPL DL<=0.005 MIU/L-ACNC: 1.2 UIU/ML (ref 0.45–4.5)

## 2021-12-22 ENCOUNTER — TELEPHONE (OUTPATIENT)
Dept: FAMILY MEDICINE CLINIC | Facility: CLINIC | Age: 76
End: 2021-12-22

## 2021-12-22 NOTE — TELEPHONE ENCOUNTER
I attempted to call patient but she had a voice mail that wasn't set up.  If she does call back...  HUB TO READ:  Please tell Dorie later today that her blood work from yesterday all looked great.  Thyroid function is normal.  Blood sugar is very good at 93.  Kidney and liver function tests are normal.  No changes based on these labs.  Keep up the good work.  Thanks

## 2022-02-10 ENCOUNTER — TELEPHONE (OUTPATIENT)
Dept: FAMILY MEDICINE CLINIC | Facility: CLINIC | Age: 77
End: 2022-02-10

## 2022-02-10 NOTE — TELEPHONE ENCOUNTER
Caller: Dorie Moctezuma    Relationship: Self    Best call back number: [phone]    What was the call regarding: PATIENT WANTED TO ASK DR FERNÁNDEZ IF IT WAS OKAY FOR HER TO TAKE PREVAGEN FOR MEMORY. PATIENT STATED THAT IT % OF DAILY VALUE OF VITAMIN D AND 10 MG OF APOAEQUORIN. PATIENT IS HESITANT TO TAKE IT WITHOUT DR FERNÁNDEZ'S APPROVAL.    Do you require a callback: YES

## 2022-04-25 ENCOUNTER — TELEPHONE (OUTPATIENT)
Dept: FAMILY MEDICINE CLINIC | Facility: CLINIC | Age: 77
End: 2022-04-25

## 2022-04-25 NOTE — TELEPHONE ENCOUNTER
Pt calling in stating she is wanting to make sure Dr. Garcia approves of her taking over the counter collagen. Pt would like a callback to advise.

## 2022-04-25 NOTE — TELEPHONE ENCOUNTER
Basically, I have no problem with this.  Please ask her what problem she is taking it for.  Thanks

## 2022-05-04 RX ORDER — METOPROLOL SUCCINATE 25 MG/1
25 TABLET, EXTENDED RELEASE ORAL DAILY
Qty: 90 TABLET | Refills: 3 | Status: SHIPPED | OUTPATIENT
Start: 2022-05-04

## 2022-05-04 RX ORDER — LEVOTHYROXINE SODIUM 0.05 MG/1
50 TABLET ORAL DAILY
Qty: 90 TABLET | Refills: 3 | Status: SHIPPED | OUTPATIENT
Start: 2022-05-04

## 2022-06-21 ENCOUNTER — OFFICE VISIT (OUTPATIENT)
Dept: FAMILY MEDICINE CLINIC | Facility: CLINIC | Age: 77
End: 2022-06-21

## 2022-06-21 VITALS
SYSTOLIC BLOOD PRESSURE: 135 MMHG | DIASTOLIC BLOOD PRESSURE: 79 MMHG | TEMPERATURE: 97.3 F | OXYGEN SATURATION: 97 % | HEIGHT: 63 IN | HEART RATE: 79 BPM | BODY MASS INDEX: 22.39 KG/M2 | WEIGHT: 126.4 LBS

## 2022-06-21 DIAGNOSIS — R73.9 HYPERGLYCEMIA: ICD-10-CM

## 2022-06-21 DIAGNOSIS — E87.5 HYPERKALEMIA: ICD-10-CM

## 2022-06-21 DIAGNOSIS — Z00.00 PREVENTATIVE HEALTH CARE: ICD-10-CM

## 2022-06-21 DIAGNOSIS — E03.9 ACQUIRED HYPOTHYROIDISM: Primary | ICD-10-CM

## 2022-06-21 PROCEDURE — 99213 OFFICE O/P EST LOW 20 MIN: CPT | Performed by: NURSE PRACTITIONER

## 2022-06-21 NOTE — PROGRESS NOTES
"    Dorie Moctezuma is a 76 y.o. female.     76-year-old white female of Dr. Sorensen who comes in today for follow-up visit so she can get medications refilled    Blood pressure 134/78 heart rate 78 she denies any chest pain, dyspnea, tachycardia or dizziness    Patient states she has no new problems.  Her weight is up 4 pounds at 126 with a BMI 22.4.  She has had 1 COVID-vaccine Elpidio & Elpidio declines any further vaccinations.  She needs to schedule an eye exam.  Her mammogram is due this month but she states she wants to wait to schedule that herself.  She declines DEXA scan or colonoscopy            Blood work today  Schedule eye exam  Reconsider second COVID-vaccine  Schedule your mammogram  We consider DEXA scan and colonoscopy or Cologuard  Follow-up 6 months with Dr. Sorensen       The following portions of the patient's history were reviewed and updated as appropriate: allergies, current medications, past family history, past medical history, past social history, past surgical history and problem list.    Vitals:    06/21/22 1458   BP: 135/79   BP Location: Right arm   Patient Position: Sitting   Cuff Size: Adult   Pulse: 79   Temp: 97.3 °F (36.3 °C)   TempSrc: Temporal   SpO2: 97%   Weight: 57.3 kg (126 lb 6.4 oz)   Height: 160 cm (63\")     Body mass index is 22.39 kg/m².    Past Medical History:   Diagnosis Date   • Hypertension    • Hyperthyroidism      Past Surgical History:   Procedure Laterality Date   • CHOLECYSTECTOMY       Family History   Problem Relation Age of Onset   • Other Sister         bladder cancer   • Heart failure Sister    • Crohn's disease Sister    • Colon cancer Sister    • Colon cancer Brother    • No Known Problems Brother      Immunization History   Administered Date(s) Administered   • FLUAD TRI 65YR+ 11/14/2006   • Pneumococcal Conjugate 13-Valent (PCV13) 10/08/2019   • Pneumococcal Polysaccharide (PPSV23) 10/30/2003   • Pneumococcal, Unspecified 11/09/2010   • Tdap " 08/24/2021   • Tetanus Toxoid, Unspecified 06/05/2013       Office Visit on 12/20/2021   Component Date Value Ref Range Status   • TSH 12/20/2021 1.200  0.450 - 4.500 uIU/mL Final   • Glucose 12/20/2021 93  65 - 99 mg/dL Final   • BUN 12/20/2021 8  8 - 27 mg/dL Final   • Creatinine 12/20/2021 0.62  0.57 - 1.00 mg/dL Final   • eGFR Non  Am 12/20/2021 87  >59 mL/min/1.73 Final   • eGFR African Am 12/20/2021 100  >59 mL/min/1.73 Final    Comment: **In accordance with recommendations from the NKF-ASN Task force,**    [facility] is in the process of updating its eGFR calculation to the    2021 CKD-EPI creatinine equation that estimates kidney function    without a race variable.     • BUN/Creatinine Ratio 12/20/2021 13  12 - 28 Final   • Sodium 12/20/2021 137  134 - 144 mmol/L Final   • Potassium 12/20/2021 4.4  3.5 - 5.2 mmol/L Final   • Chloride 12/20/2021 100  96 - 106 mmol/L Final   • Total CO2 12/20/2021 23  20 - 29 mmol/L Final   • Calcium 12/20/2021 10.0  8.7 - 10.3 mg/dL Final   • Total Protein 12/20/2021 7.1  6.0 - 8.5 g/dL Final   • Albumin 12/20/2021 4.7  3.7 - 4.7 g/dL Final   • Globulin 12/20/2021 2.4  1.5 - 4.5 g/dL Final   • A/G Ratio 12/20/2021 2.0  1.2 - 2.2 Final   • Total Bilirubin 12/20/2021 0.7  0.0 - 1.2 mg/dL Final   • Alkaline Phosphatase 12/20/2021 93  44 - 121 IU/L Final                  **Please note reference interval change**   • AST (SGOT) 12/20/2021 23  0 - 40 IU/L Final   • ALT (SGPT) 12/20/2021 13  0 - 32 IU/L Final   • Hemoglobin A1C 12/20/2021 5.5  4.8 - 5.6 % Final    Comment:          Prediabetes: 5.7 - 6.4           Diabetes: >6.4           Glycemic control for adults with diabetes: <7.0           Review of Systems   Constitutional: Negative.    HENT: Negative.    Respiratory: Negative.    Cardiovascular: Negative.    Gastrointestinal: Negative.    Genitourinary: Negative.    Musculoskeletal: Negative.    Skin: Negative.    Neurological: Negative.    Psychiatric/Behavioral:  Negative.        Objective   Physical Exam  Constitutional:       Appearance: Normal appearance.   HENT:      Head: Normocephalic.   Cardiovascular:      Rate and Rhythm: Normal rate and regular rhythm.      Pulses: Normal pulses.      Heart sounds: Normal heart sounds.   Pulmonary:      Effort: Pulmonary effort is normal.      Breath sounds: Normal breath sounds.   Abdominal:      General: Bowel sounds are normal.   Musculoskeletal:         General: Normal range of motion.   Skin:     General: Skin is warm and dry.   Neurological:      General: No focal deficit present.      Mental Status: She is alert and oriented to person, place, and time.   Psychiatric:         Mood and Affect: Mood normal.         Behavior: Behavior normal.         Procedures    Assessment & Plan   Diagnoses and all orders for this visit:    1. Acquired hypothyroidism (Primary)  -     TSH+Free T4  -     T3    2. Preventative health care  -     Comprehensive Metabolic Panel    3. Hyperglycemia    4. Hyperkalemia          Current Outpatient Medications:   •  Apoaequorin (PREVAGEN PO), Take  by mouth., Disp: , Rfl:   •  levothyroxine (SYNTHROID, LEVOTHROID) 50 MCG tablet, Take 1 tablet by mouth Daily., Disp: 90 tablet, Rfl: 3  •  metoprolol succinate XL (TOPROL-XL) 25 MG 24 hr tablet, Take 1 tablet by mouth Daily., Disp: 90 tablet, Rfl: 3  •  Garlic (GARLIQUE PO), Take  by mouth., Disp: , Rfl:            JAXSON Padgett 6/21/2022 15:46 EDT  This note has been electronically signed

## 2022-06-21 NOTE — PATIENT INSTRUCTIONS
Blood work today  Schedule eye exam  Reconsider second COVID-vaccine  Schedule your mammogram  We consider DEXA scan and colonoscopy or Cologuard  Follow-up 6 months with Dr. Sorensen

## 2022-06-22 LAB
ALBUMIN SERPL-MCNC: 4.2 G/DL (ref 3.7–4.7)
ALBUMIN/GLOB SERPL: 1.8 {RATIO} (ref 1.2–2.2)
ALP SERPL-CCNC: 82 IU/L (ref 44–121)
ALT SERPL-CCNC: 14 IU/L (ref 0–32)
AST SERPL-CCNC: 21 IU/L (ref 0–40)
BILIRUB SERPL-MCNC: 0.4 MG/DL (ref 0–1.2)
BUN SERPL-MCNC: 11 MG/DL (ref 8–27)
BUN/CREAT SERPL: 17 (ref 12–28)
CALCIUM SERPL-MCNC: 9.8 MG/DL (ref 8.7–10.3)
CHLORIDE SERPL-SCNC: 95 MMOL/L (ref 96–106)
CO2 SERPL-SCNC: 24 MMOL/L (ref 20–29)
CREAT SERPL-MCNC: 0.63 MG/DL (ref 0.57–1)
EGFRCR SERPLBLD CKD-EPI 2021: 91 ML/MIN/1.73
GLOBULIN SER CALC-MCNC: 2.4 G/DL (ref 1.5–4.5)
GLUCOSE SERPL-MCNC: 105 MG/DL (ref 65–99)
POTASSIUM SERPL-SCNC: 4.9 MMOL/L (ref 3.5–5.2)
PROT SERPL-MCNC: 6.6 G/DL (ref 6–8.5)
SODIUM SERPL-SCNC: 133 MMOL/L (ref 134–144)
T3 SERPL-MCNC: 105 NG/DL (ref 71–180)
T4 FREE SERPL-MCNC: 1.77 NG/DL (ref 0.82–1.77)
TSH SERPL DL<=0.005 MIU/L-ACNC: 1.29 UIU/ML (ref 0.45–4.5)

## 2022-07-15 ENCOUNTER — TELEPHONE (OUTPATIENT)
Dept: FAMILY MEDICINE CLINIC | Facility: CLINIC | Age: 77
End: 2022-07-15

## 2022-07-15 DIAGNOSIS — Z12.31 SCREENING MAMMOGRAM FOR BREAST CANCER: Primary | ICD-10-CM

## 2022-07-15 NOTE — TELEPHONE ENCOUNTER
Caller: Dorie Moctezuma    Relationship: Self    Best call back number: [phone]     What is the medical concern/diagnosis: MAMMOGRAM    What specialty or service is being requested: MAMMOGRAM    What is the provider, practice or medical service name: [facility]    What is the office location: [email or web address]

## 2023-04-20 RX ORDER — METOPROLOL SUCCINATE 25 MG/1
TABLET, EXTENDED RELEASE ORAL
Qty: 90 TABLET | Refills: 0 | Status: SHIPPED | OUTPATIENT
Start: 2023-04-20

## 2023-04-20 RX ORDER — LEVOTHYROXINE SODIUM 0.05 MG/1
TABLET ORAL
Qty: 90 TABLET | Refills: 0 | Status: SHIPPED | OUTPATIENT
Start: 2023-04-20

## 2023-04-26 ENCOUNTER — OFFICE VISIT (OUTPATIENT)
Dept: FAMILY MEDICINE CLINIC | Facility: CLINIC | Age: 78
End: 2023-04-26
Payer: MEDICARE

## 2023-04-26 VITALS
BODY MASS INDEX: 24.06 KG/M2 | OXYGEN SATURATION: 98 % | HEIGHT: 63 IN | RESPIRATION RATE: 18 BRPM | TEMPERATURE: 97.7 F | WEIGHT: 135.8 LBS | HEART RATE: 59 BPM | DIASTOLIC BLOOD PRESSURE: 88 MMHG | SYSTOLIC BLOOD PRESSURE: 154 MMHG

## 2023-04-26 DIAGNOSIS — E78.2 MIXED HYPERLIPIDEMIA: ICD-10-CM

## 2023-04-26 DIAGNOSIS — E03.9 ACQUIRED HYPOTHYROIDISM: ICD-10-CM

## 2023-04-26 DIAGNOSIS — R73.9 HYPERGLYCEMIA: ICD-10-CM

## 2023-04-26 DIAGNOSIS — F41.1 GENERALIZED ANXIETY DISORDER: ICD-10-CM

## 2023-04-26 DIAGNOSIS — I10 ESSENTIAL HYPERTENSION: Primary | ICD-10-CM

## 2023-04-26 PROCEDURE — 99214 OFFICE O/P EST MOD 30 MIN: CPT | Performed by: FAMILY MEDICINE

## 2023-04-26 PROCEDURE — 3077F SYST BP >= 140 MM HG: CPT | Performed by: FAMILY MEDICINE

## 2023-04-26 PROCEDURE — 3079F DIAST BP 80-89 MM HG: CPT | Performed by: FAMILY MEDICINE

## 2023-04-26 PROCEDURE — 1160F RVW MEDS BY RX/DR IN RCRD: CPT | Performed by: FAMILY MEDICINE

## 2023-04-26 PROCEDURE — 1159F MED LIST DOCD IN RCRD: CPT | Performed by: FAMILY MEDICINE

## 2023-04-26 RX ORDER — LEVOTHYROXINE SODIUM 0.05 MG/1
50 TABLET ORAL DAILY
Qty: 90 TABLET | Refills: 3 | Status: SHIPPED | OUTPATIENT
Start: 2023-04-26

## 2023-04-26 RX ORDER — METOPROLOL SUCCINATE 25 MG/1
25 TABLET, EXTENDED RELEASE ORAL DAILY
Qty: 90 TABLET | Refills: 3 | Status: SHIPPED | OUTPATIENT
Start: 2023-04-26

## 2023-04-26 NOTE — PROGRESS NOTES
Subjective   Dorie Moctezuma is a 77 y.o. female.   Chief Complaint   Patient presents with   • Hypertension   • Hypothyroidism       History of Present Illness   77-year-old white female with active problem list as below presents to the office today for follow-up.  Schedule says refills.  I have not seen her since December 2021.  She is continued to request refills and I asked her to come in for appointment.  She had blood work in June of last year to check her thyroid and a CMP that was okay.  Audrey ordered that.  She had a mammogram on July 27, 2022 that was normal.  She does not want to schedule another mammogram now.  She has to go with her daughter.  She wants to find out when her daughter is going to have her mammogram.    She has no complaints of chest pain, shortness of air.  She has occasional constipation.  She sleeps okay.  Mood is stable.  Does not follow her blood pressure at home.        Patient Active Problem List    Diagnosis Date Noted   • Generalized anxiety disorder 04/26/2023   • Hyperglycemia 07/21/2020   • Hyperkalemia 07/21/2020   • Mixed hyperlipidemia 07/21/2020   • Essential hypertension 07/21/2020   • Acquired hypothyroidism 07/21/2020   • Insomnia 07/21/2020   • Osteoporosis 07/21/2020   • Smoker 07/21/2020   • Breast lump 07/21/2020     Note Last Updated: 7/21/2020     H/o biopsy in past - was OK.     • Chronic hepatitis C without hepatic coma 07/21/2020     Note Last Updated: 7/21/2020     Diagnosed in 1970's  Per patient - had tests that showed hepatitis A             Past Surgical History:   Procedure Laterality Date   • CHOLECYSTECTOMY       Current Outpatient Medications on File Prior to Visit   Medication Sig   • [DISCONTINUED] Apoaequorin (PREVAGEN PO) Take  by mouth.   • [DISCONTINUED] busPIRone (BUSPAR) 5 MG tablet Take 1 tablet by mouth 3 (Three) Times a Day.   • [DISCONTINUED] Garlic (GARLIQUE PO) Take  by mouth.   • [DISCONTINUED] levothyroxine (SYNTHROID, LEVOTHROID) 50  "MCG tablet Take 1 tablet by mouth once daily   • [DISCONTINUED] metoprolol succinate XL (TOPROL-XL) 25 MG 24 hr tablet Take 1 tablet by mouth once daily     No current facility-administered medications on file prior to visit.     Allergies   Allergen Reactions   • Benzocaine (Topical) Rash   • Latex, Natural Rubber Rash     Social History     Socioeconomic History   • Marital status: Single   Tobacco Use   • Smoking status: Every Day     Packs/day: 0.50     Years: 66.00     Pack years: 33.00     Types: Cigarettes     Start date: 1957     Passive exposure: Current   • Smokeless tobacco: Never   Vaping Use   • Vaping Use: Never used   Substance and Sexual Activity   • Alcohol use: Never   • Drug use: Never   • Sexual activity: Not Currently     Family History   Problem Relation Age of Onset   • Other Sister         bladder cancer   • Heart failure Sister    • Crohn's disease Sister    • Colon cancer Sister    • Colon cancer Brother    • No Known Problems Brother        Review of Systems    Objective   /88 (BP Location: Right arm, Patient Position: Sitting, Cuff Size: Adult)   Pulse 59   Temp 97.7 °F (36.5 °C) (Infrared)   Resp 18   Ht 160 cm (63\")   Wt 61.6 kg (135 lb 12.8 oz)   LMP  (LMP Unknown)   SpO2 98%   Breastfeeding No   BMI 24.06 kg/m²   Physical Exam  Constitutional:       General: She is not in acute distress.     Appearance: She is well-developed.      Comments: Wearing a face mask  Thin, white female  Neatly groomed.   HENT:      Head: Normocephalic and atraumatic.   Eyes:      Conjunctiva/sclera: Conjunctivae normal.   Cardiovascular:      Rate and Rhythm: Normal rate and regular rhythm.      Heart sounds: Normal heart sounds. No murmur heard.  Pulmonary:      Effort: Pulmonary effort is normal. No respiratory distress.      Breath sounds: Normal breath sounds.   Musculoskeletal:         General: Normal range of motion.      Cervical back: Normal range of motion.   Skin:     General: Skin " is warm and dry.      Findings: No rash.   Neurological:      Mental Status: She is alert and oriented to person, place, and time.      Gait: Gait normal.   Psychiatric:         Mood and Affect: Mood is anxious.         Speech: Speech is rapid and pressured.         Behavior: Behavior normal.         Cognition and Memory: Cognition is impaired. Memory is impaired.           No visits with results within 4 Month(s) from this visit.   Latest known visit with results is:   Office Visit on 06/21/2022   Component Date Value Ref Range Status   • Glucose 06/21/2022 105 (H)  65 - 99 mg/dL Final   • BUN 06/21/2022 11  8 - 27 mg/dL Final   • Creatinine 06/21/2022 0.63  0.57 - 1.00 mg/dL Final   • EGFR Result 06/21/2022 91  >59 mL/min/1.73 Final   • BUN/Creatinine Ratio 06/21/2022 17  12 - 28 Final   • Sodium 06/21/2022 133 (L)  134 - 144 mmol/L Final   • Potassium 06/21/2022 4.9  3.5 - 5.2 mmol/L Final   • Chloride 06/21/2022 95 (L)  96 - 106 mmol/L Final   • Total CO2 06/21/2022 24  20 - 29 mmol/L Final   • Calcium 06/21/2022 9.8  8.7 - 10.3 mg/dL Final   • Total Protein 06/21/2022 6.6  6.0 - 8.5 g/dL Final   • Albumin 06/21/2022 4.2  3.7 - 4.7 g/dL Final   • Globulin 06/21/2022 2.4  1.5 - 4.5 g/dL Final   • A/G Ratio 06/21/2022 1.8  1.2 - 2.2 Final   • Total Bilirubin 06/21/2022 0.4  0.0 - 1.2 mg/dL Final   • Alkaline Phosphatase 06/21/2022 82  44 - 121 IU/L Final   • AST (SGOT) 06/21/2022 21  0 - 40 IU/L Final   • ALT (SGPT) 06/21/2022 14  0 - 32 IU/L Final   • TSH 06/21/2022 1.290  0.450 - 4.500 uIU/mL Final   • Free T4 06/21/2022 1.77  0.82 - 1.77 ng/dL Final   • T3, Total 06/21/2022 105  71 - 180 ng/dL Final         Assessment & Plan   Diagnoses and all orders for this visit:    1. Essential hypertension (Primary)  -     metoprolol succinate XL (TOPROL-XL) 25 MG 24 hr tablet; Take 1 tablet by mouth Daily.  Dispense: 90 tablet; Refill: 3  -     Comprehensive Metabolic Panel  -     CBC & Differential  -      Magnesium    2. Mixed hyperlipidemia  -     Lipid Panel    3. Acquired hypothyroidism  -     levothyroxine (SYNTHROID, LEVOTHROID) 50 MCG tablet; Take 1 tablet by mouth Daily.  Dispense: 90 tablet; Refill: 3  -     TSH  -     T4, Free  -     T3, Free    4. Hyperglycemia  -     Hemoglobin A1c    5. Generalized anxiety disorder    Status of multiple chronic medical problems reviewed today as above.  She tells me she is very anxious about coming out of her house and coming to the office.  She thinks that is why her blood pressure is up a little bit.  I have asked her to keep an eye on her blood pressure at home and let me know if the systolic blood pressure is above 140.  We will get labs today to follow her high cholesterol, hypothyroidism, renal function, hemoglobin.  Refill metoprolol and levothyroxine at current doses.  We will screen her for diabetes with an A1c.  Anxiety is stable as long as she does not get herself and to any stressful situations.  I do not know if there is a role for medication anymore.  She was on BuSpar at 1 point in the past, but does not want to take that.  I think that is perfectly fine.  I will follow-up with her when her blood test are back.  I would like to see her again at least in 1 year.  Once she knows when her daughter is able to get her mammogram, just let me know and I will be happy to send an order in for her as well.        Call with any problems or concerns before next visit       Return in about 1 year (around 4/26/2024).      Much of this report is an electronic transcription of spoken language to printed text using Dragon dictation software.  As such, the subtleties and finesse of spoken language may permit erroneous, or at times, nonsensical words or phrases to be inadvertently transcribed; thus changes may be made at a later date to rectify these errors.     Radha Sorensen MD4/26/202317:18 EDT  This note has been electronically signed

## 2023-04-27 LAB
ALBUMIN SERPL-MCNC: 4.2 G/DL (ref 3.7–4.7)
ALBUMIN/GLOB SERPL: 1.7 {RATIO} (ref 1.2–2.2)
ALP SERPL-CCNC: 90 IU/L (ref 44–121)
ALT SERPL-CCNC: 17 IU/L (ref 0–32)
AST SERPL-CCNC: 22 IU/L (ref 0–40)
BASOPHILS # BLD AUTO: 0.1 X10E3/UL (ref 0–0.2)
BASOPHILS NFR BLD AUTO: 1 %
BILIRUB SERPL-MCNC: 0.4 MG/DL (ref 0–1.2)
BUN SERPL-MCNC: 11 MG/DL (ref 8–27)
BUN/CREAT SERPL: 19 (ref 12–28)
CALCIUM SERPL-MCNC: 9.3 MG/DL (ref 8.7–10.3)
CHLORIDE SERPL-SCNC: 97 MMOL/L (ref 96–106)
CHOLEST SERPL-MCNC: 220 MG/DL (ref 100–199)
CO2 SERPL-SCNC: 26 MMOL/L (ref 20–29)
CREAT SERPL-MCNC: 0.57 MG/DL (ref 0.57–1)
EGFRCR SERPLBLD CKD-EPI 2021: 92 ML/MIN/1.73
EOSINOPHIL # BLD AUTO: 0.1 X10E3/UL (ref 0–0.4)
EOSINOPHIL NFR BLD AUTO: 2 %
ERYTHROCYTE [DISTWIDTH] IN BLOOD BY AUTOMATED COUNT: 12.5 % (ref 11.7–15.4)
GLOBULIN SER CALC-MCNC: 2.5 G/DL (ref 1.5–4.5)
GLUCOSE SERPL-MCNC: 84 MG/DL (ref 70–99)
HBA1C MFR BLD: 5.9 % (ref 4.8–5.6)
HCT VFR BLD AUTO: 42.3 % (ref 34–46.6)
HDLC SERPL-MCNC: 70 MG/DL
HGB BLD-MCNC: 14.2 G/DL (ref 11.1–15.9)
IMM GRANULOCYTES # BLD AUTO: 0 X10E3/UL (ref 0–0.1)
IMM GRANULOCYTES NFR BLD AUTO: 0 %
LDLC SERPL CALC-MCNC: 132 MG/DL (ref 0–99)
LYMPHOCYTES # BLD AUTO: 2.9 X10E3/UL (ref 0.7–3.1)
LYMPHOCYTES NFR BLD AUTO: 41 %
MAGNESIUM SERPL-MCNC: 2.1 MG/DL (ref 1.6–2.3)
MCH RBC QN AUTO: 30.9 PG (ref 26.6–33)
MCHC RBC AUTO-ENTMCNC: 33.6 G/DL (ref 31.5–35.7)
MCV RBC AUTO: 92 FL (ref 79–97)
MONOCYTES # BLD AUTO: 0.5 X10E3/UL (ref 0.1–0.9)
MONOCYTES NFR BLD AUTO: 7 %
NEUTROPHILS # BLD AUTO: 3.5 X10E3/UL (ref 1.4–7)
NEUTROPHILS NFR BLD AUTO: 49 %
PLATELET # BLD AUTO: 290 X10E3/UL (ref 150–450)
POTASSIUM SERPL-SCNC: 4.8 MMOL/L (ref 3.5–5.2)
PROT SERPL-MCNC: 6.7 G/DL (ref 6–8.5)
RBC # BLD AUTO: 4.6 X10E6/UL (ref 3.77–5.28)
SODIUM SERPL-SCNC: 134 MMOL/L (ref 134–144)
T3FREE SERPL-MCNC: 2.9 PG/ML (ref 2–4.4)
T4 FREE SERPL-MCNC: 1.53 NG/DL (ref 0.82–1.77)
TRIGL SERPL-MCNC: 102 MG/DL (ref 0–149)
TSH SERPL DL<=0.005 MIU/L-ACNC: 2.09 UIU/ML (ref 0.45–4.5)
VLDLC SERPL CALC-MCNC: 18 MG/DL (ref 5–40)
WBC # BLD AUTO: 7.1 X10E3/UL (ref 3.4–10.8)

## 2023-07-24 ENCOUNTER — OFFICE VISIT (OUTPATIENT)
Dept: FAMILY MEDICINE CLINIC | Facility: CLINIC | Age: 78
End: 2023-07-24
Payer: MEDICARE

## 2023-07-24 VITALS
BODY MASS INDEX: 22.25 KG/M2 | WEIGHT: 125.6 LBS | DIASTOLIC BLOOD PRESSURE: 84 MMHG | HEIGHT: 63 IN | HEART RATE: 80 BPM | OXYGEN SATURATION: 94 % | TEMPERATURE: 97.7 F | SYSTOLIC BLOOD PRESSURE: 144 MMHG

## 2023-07-24 DIAGNOSIS — B37.0 THRUSH: ICD-10-CM

## 2023-07-24 DIAGNOSIS — F41.1 GENERALIZED ANXIETY DISORDER: Primary | ICD-10-CM

## 2023-07-24 PROCEDURE — 3079F DIAST BP 80-89 MM HG: CPT | Performed by: FAMILY MEDICINE

## 2023-07-24 PROCEDURE — 3077F SYST BP >= 140 MM HG: CPT | Performed by: FAMILY MEDICINE

## 2023-07-24 PROCEDURE — 99214 OFFICE O/P EST MOD 30 MIN: CPT | Performed by: FAMILY MEDICINE

## 2023-07-24 RX ORDER — PAROXETINE 10 MG/1
10 TABLET, FILM COATED ORAL EVERY MORNING
Qty: 30 TABLET | Refills: 1 | Status: SHIPPED | OUTPATIENT
Start: 2023-07-24

## 2023-07-24 NOTE — PROGRESS NOTES
Subjective   Dorie Moctezuma is a 78 y.o. female.   Chief Complaint   Patient presents with    Anxiety    Memory Loss       History of Present Illness   Presents to the office today accompanied by her daughter.  Her daughter is concerned about weight loss and memory loss.  Weight today is 125.6 pounds.  I last saw Dorie in April of this year-about 3 months ago.  Her weight is 135 pounds.  About 1 year ago in June 2022 her weight was 126 pounds.  December 2021 her weight was 122 pounds and nearly 2 years ago to the day her weight was 119 pounds.  Dorie reports no problems eating.  She does not choke when she swallows.  She just is not hungry.  She mentions that her tongue does hurt sometimes.    Memory loss-gets easily confused.  There is a problem with her home that she is temporarily and I will tell.  She spends most of her days alone.  When her family checks on her she is noted to be significantly more confused, irritable.    She does have anxiety.  We talked about this at office visits before.  She has been concerned about her weight over the last few years, but her weight has been relatively stable.        Patient Active Problem List    Diagnosis Date Noted    Generalized anxiety disorder 04/26/2023    Hyperglycemia 07/21/2020    Hyperkalemia 07/21/2020    Mixed hyperlipidemia 07/21/2020    Essential hypertension 07/21/2020    Acquired hypothyroidism 07/21/2020    Insomnia 07/21/2020    Osteoporosis 07/21/2020    Smoker 07/21/2020    Breast lump 07/21/2020     Note Last Updated: 7/21/2020     H/o biopsy in past - was OK.      Chronic hepatitis C without hepatic coma 07/21/2020     Note Last Updated: 7/21/2020     Diagnosed in 1970's  Per patient - had tests that showed hepatitis A             Past Surgical History:   Procedure Laterality Date    CHOLECYSTECTOMY       Current Outpatient Medications on File Prior to Visit   Medication Sig    levothyroxine (SYNTHROID, LEVOTHROID) 50 MCG tablet Take 1 tablet by  "mouth Daily.    metoprolol succinate XL (TOPROL-XL) 25 MG 24 hr tablet Take 1 tablet by mouth Daily.    [DISCONTINUED] busPIRone (BUSPAR) 5 MG tablet Take 1 tablet by mouth 3 (Three) Times a Day.     No current facility-administered medications on file prior to visit.     Allergies   Allergen Reactions    Benzocaine (Topical) Rash    Latex, Natural Rubber Rash     Social History     Socioeconomic History    Marital status: Single   Tobacco Use    Smoking status: Every Day     Packs/day: 0.50     Years: 66.00     Pack years: 33.00     Types: Cigarettes     Start date: 1957     Passive exposure: Current    Smokeless tobacco: Never   Vaping Use    Vaping Use: Never used   Substance and Sexual Activity    Alcohol use: Never    Drug use: Never    Sexual activity: Not Currently     Family History   Problem Relation Age of Onset    Other Sister         bladder cancer    Heart failure Sister     Crohn's disease Sister     Colon cancer Sister     Colon cancer Brother     No Known Problems Brother        Review of Systems    Objective   /84 (BP Location: Right arm, Patient Position: Sitting, Cuff Size: Adult)   Pulse 80   Temp 97.7 øF (36.5 øC) (Temporal)   Ht 160 cm (63\")   Wt 57 kg (125 lb 9.6 oz)   LMP  (LMP Unknown)   SpO2 94%   BMI 22.25 kg/mý   Physical Exam  Constitutional:       Appearance: She is well-developed.      Comments:   Thin, elderly white female-no distress.   HENT:      Head: Normocephalic and atraumatic.      Mouth/Throat:      Comments: Whitish coating on her tongue consistent with thrush  Eyes:      Conjunctiva/sclera: Conjunctivae normal.   Cardiovascular:      Rate and Rhythm: Normal rate.   Pulmonary:      Effort: Pulmonary effort is normal.   Musculoskeletal:         General: Normal range of motion.      Cervical back: Normal range of motion.   Skin:     General: Skin is warm and dry.      Findings: No rash.   Neurological:      Mental Status: She is alert and oriented to person, place, " and time.   Psychiatric:         Mood and Affect: Affect is labile and flat.         Behavior: Behavior normal.      Comments: Speech is very circumstantial.  Does not typically answer questions directly.                 Assessment & Plan   Diagnoses and all orders for this visit:    1. Generalized anxiety disorder (Primary)  -     PARoxetine (Paxil) 10 MG tablet; Take 1 tablet by mouth Every Morning. With mid-morning medicine  Dispense: 30 tablet; Refill: 1    2. Thrush  -     nystatin (MYCOSTATIN) 100,000 unit/mL suspension; Swish and swallow 5 mL 4 (Four) Times a Day for 7 days.  Dispense: 200 mL; Refill: 0    Over the past 2 years her weight has been typically between 120 and 130 pounds.  She is now living alone.  She is in a different environment.  Unclear exactly how much that contributes.  She is 78 years old.  She is a current every day smoker.  She has not had imaging of her brain.  We may need to look into that.  It is possible she has some vascular dementia.  Could be she is anxious and out of sorts because of her different environment.  I would recommend we try Paxil at a low dose in hopes of stabilizing her anxiety and possibly increasing her appetite.  Paxil is known to do that.  We will reevaluate her in a few weeks.  We will treat what looks like a little bit of thrush with Mycostatin suspension.  I do not think that solely is responsible for reported complaint of weight loss.  We will reevaluate here in a few weeks and talk about any further work-up that may be necessary.        Call with any problems or concerns before next visit       Return in about 4 weeks (around 8/21/2023), or +EXT visit.      Much of this report is an electronic transcription of spoken language to printed text using Dragon dictation software.  As such, the subtleties and finesse of spoken language may permit erroneous, or at times, nonsensical words or phrases to be inadvertently transcribed; thus changes may be made at a  later date to rectify these errors.     Radha Sorensen MD8/13/202311:27 EDT  This note has been electronically signed

## 2023-08-12 ENCOUNTER — APPOINTMENT (OUTPATIENT)
Dept: CT IMAGING | Facility: HOSPITAL | Age: 78
End: 2023-08-12
Payer: MEDICARE

## 2023-08-12 ENCOUNTER — HOSPITAL ENCOUNTER (EMERGENCY)
Facility: HOSPITAL | Age: 78
Discharge: HOME | End: 2023-08-12
Attending: PEDIATRICS | Admitting: PEDIATRICS
Payer: MEDICARE

## 2023-08-12 VITALS
OXYGEN SATURATION: 93 % | SYSTOLIC BLOOD PRESSURE: 105 MMHG | BODY MASS INDEX: 21.34 KG/M2 | WEIGHT: 125 LBS | DIASTOLIC BLOOD PRESSURE: 71 MMHG | RESPIRATION RATE: 12 BRPM | HEIGHT: 64 IN | TEMPERATURE: 97.3 F | HEART RATE: 63 BPM

## 2023-08-12 DIAGNOSIS — K59.00 CONSTIPATION, UNSPECIFIED CONSTIPATION TYPE: Primary | ICD-10-CM

## 2023-08-12 DIAGNOSIS — N39.0 URINARY TRACT INFECTION IN FEMALE: ICD-10-CM

## 2023-08-12 LAB
ALBUMIN SERPL-MCNC: 4.2 G/DL (ref 3.5–5.2)
ALBUMIN/GLOB SERPL: 1.5 G/DL
ALP SERPL-CCNC: 111 U/L (ref 39–117)
ALT SERPL W P-5'-P-CCNC: 25 U/L (ref 1–33)
ANION GAP SERPL CALCULATED.3IONS-SCNC: 13.1 MMOL/L (ref 5–15)
AST SERPL-CCNC: 24 U/L (ref 1–32)
BACTERIA UR QL AUTO: ABNORMAL /HPF
BASOPHILS # BLD AUTO: 0.04 10*3/MM3 (ref 0–0.2)
BASOPHILS NFR BLD AUTO: 0.5 % (ref 0–1.5)
BILIRUB SERPL-MCNC: 0.8 MG/DL (ref 0–1.2)
BILIRUB UR QL STRIP: ABNORMAL
BUN SERPL-MCNC: 23 MG/DL (ref 8–23)
BUN/CREAT SERPL: 30.3 (ref 7–25)
CALCIUM SPEC-SCNC: 10.3 MG/DL (ref 8.6–10.5)
CHLORIDE SERPL-SCNC: 97 MMOL/L (ref 98–107)
CLARITY UR: CLEAR
CO2 SERPL-SCNC: 24.9 MMOL/L (ref 22–29)
COLOR UR: YELLOW
CREAT SERPL-MCNC: 0.76 MG/DL (ref 0.57–1)
DEPRECATED RDW RBC AUTO: 41.8 FL (ref 37–54)
EGFRCR SERPLBLD CKD-EPI 2021: 79.3 ML/MIN/1.73
EOSINOPHIL # BLD AUTO: 0.04 10*3/MM3 (ref 0–0.4)
EOSINOPHIL NFR BLD AUTO: 0.5 % (ref 0.3–6.2)
ERYTHROCYTE [DISTWIDTH] IN BLOOD BY AUTOMATED COUNT: 12.5 % (ref 12.3–15.4)
GLOBULIN UR ELPH-MCNC: 2.8 GM/DL
GLUCOSE SERPL-MCNC: 85 MG/DL (ref 65–99)
GLUCOSE UR STRIP-MCNC: NEGATIVE MG/DL
HCT VFR BLD AUTO: 48.6 % (ref 34–46.6)
HGB BLD-MCNC: 15.9 G/DL (ref 12–15.9)
HGB UR QL STRIP.AUTO: ABNORMAL
HYALINE CASTS UR QL AUTO: ABNORMAL /LPF
IMM GRANULOCYTES # BLD AUTO: 0.01 10*3/MM3 (ref 0–0.05)
IMM GRANULOCYTES NFR BLD AUTO: 0.1 % (ref 0–0.5)
KETONES UR QL STRIP: ABNORMAL
LEUKOCYTE ESTERASE UR QL STRIP.AUTO: ABNORMAL
LIPASE SERPL-CCNC: 30 U/L (ref 13–60)
LYMPHOCYTES # BLD AUTO: 1.84 10*3/MM3 (ref 0.7–3.1)
LYMPHOCYTES NFR BLD AUTO: 22.8 % (ref 19.6–45.3)
MCH RBC QN AUTO: 29.7 PG (ref 26.6–33)
MCHC RBC AUTO-ENTMCNC: 32.7 G/DL (ref 31.5–35.7)
MCV RBC AUTO: 90.8 FL (ref 79–97)
MONOCYTES # BLD AUTO: 0.51 10*3/MM3 (ref 0.1–0.9)
MONOCYTES NFR BLD AUTO: 6.3 % (ref 5–12)
MUCOUS THREADS URNS QL MICRO: ABNORMAL /HPF
NEUTROPHILS NFR BLD AUTO: 5.62 10*3/MM3 (ref 1.7–7)
NEUTROPHILS NFR BLD AUTO: 69.8 % (ref 42.7–76)
NITRITE UR QL STRIP: NEGATIVE
PH UR STRIP.AUTO: 5.5 [PH] (ref 5–8)
PLATELET # BLD AUTO: 311 10*3/MM3 (ref 140–450)
PMV BLD AUTO: 11 FL (ref 6–12)
POTASSIUM SERPL-SCNC: 4.1 MMOL/L (ref 3.5–5.2)
PROT SERPL-MCNC: 7 G/DL (ref 6–8.5)
PROT UR QL STRIP: ABNORMAL
RBC # BLD AUTO: 5.35 10*6/MM3 (ref 3.77–5.28)
RBC # UR STRIP: ABNORMAL /HPF
REF LAB TEST METHOD: ABNORMAL
SODIUM SERPL-SCNC: 135 MMOL/L (ref 136–145)
SP GR UR STRIP: >=1.03 (ref 1–1.03)
SQUAMOUS #/AREA URNS HPF: ABNORMAL /HPF
UROBILINOGEN UR QL STRIP: ABNORMAL
WBC # UR STRIP: ABNORMAL /HPF
WBC NRBC COR # BLD: 8.06 10*3/MM3 (ref 3.4–10.8)

## 2023-08-12 PROCEDURE — 81001 URINALYSIS AUTO W/SCOPE: CPT | Performed by: NURSE PRACTITIONER

## 2023-08-12 PROCEDURE — 85025 COMPLETE CBC W/AUTO DIFF WBC: CPT | Performed by: NURSE PRACTITIONER

## 2023-08-12 PROCEDURE — 74177 CT ABD & PELVIS W/CONTRAST: CPT

## 2023-08-12 PROCEDURE — 83690 ASSAY OF LIPASE: CPT | Performed by: NURSE PRACTITIONER

## 2023-08-12 PROCEDURE — 80053 COMPREHEN METABOLIC PANEL: CPT | Performed by: NURSE PRACTITIONER

## 2023-08-12 PROCEDURE — 96360 HYDRATION IV INFUSION INIT: CPT

## 2023-08-12 PROCEDURE — 99285 EMERGENCY DEPT VISIT HI MDM: CPT

## 2023-08-12 PROCEDURE — 25510000001 IOPAMIDOL PER 1 ML: Performed by: PEDIATRICS

## 2023-08-12 RX ORDER — LACTULOSE 10 G/15ML
20 SOLUTION ORAL ONCE
Status: COMPLETED | OUTPATIENT
Start: 2023-08-12 | End: 2023-08-12

## 2023-08-12 RX ORDER — CEPHALEXIN 500 MG/1
500 CAPSULE ORAL 3 TIMES DAILY
Qty: 30 CAPSULE | Refills: 0 | Status: SHIPPED | OUTPATIENT
Start: 2023-08-12 | End: 2023-08-22

## 2023-08-12 RX ORDER — SODIUM CHLORIDE 0.9 % (FLUSH) 0.9 %
10 SYRINGE (ML) INJECTION AS NEEDED
Status: DISCONTINUED | OUTPATIENT
Start: 2023-08-12 | End: 2023-08-13 | Stop reason: HOSPADM

## 2023-08-12 RX ORDER — POLYETHYLENE GLYCOL 3350 17 G/17G
17 POWDER, FOR SOLUTION ORAL 2 TIMES DAILY
Qty: 28 PACKET | Refills: 0 | Status: SHIPPED | OUTPATIENT
Start: 2023-08-12 | End: 2023-08-26

## 2023-08-12 RX ADMIN — LACTULOSE 20 G: 20 SOLUTION ORAL at 20:40

## 2023-08-12 RX ADMIN — IOPAMIDOL 100 ML: 755 INJECTION, SOLUTION INTRAVENOUS at 19:22

## 2023-08-12 RX ADMIN — SODIUM CHLORIDE 1000 ML: 0.9 INJECTION, SOLUTION INTRAVENOUS at 18:17
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
## 2023-08-13 NOTE — DISCHARGE INSTRUCTIONS
Return for any new or worsening symptoms.    Medications as prescribed: miralax for constipation. Cephalexin for Urinary tract infection.     Follow-up with primary care for further evaluation and treatment.  You should call and get a follow-up appointment with your physician.    Sure you are drinking plenty of fluids and eating properly. You have to eat and drink you or you will end up being admitting to the hospital if you continue.     MiraLAX twice daily till you start having Bowel movements regularly    You can also take milk of magnesia over the counter per manufactoring directions as well.

## 2023-08-18 ENCOUNTER — OFFICE VISIT (OUTPATIENT)
Dept: FAMILY MEDICINE CLINIC | Facility: CLINIC | Age: 78
End: 2023-08-18
Payer: MEDICARE

## 2023-08-18 VITALS
SYSTOLIC BLOOD PRESSURE: 124 MMHG | WEIGHT: 118.4 LBS | HEIGHT: 64 IN | BODY MASS INDEX: 20.22 KG/M2 | HEART RATE: 60 BPM | OXYGEN SATURATION: 92 % | DIASTOLIC BLOOD PRESSURE: 77 MMHG | TEMPERATURE: 97.6 F

## 2023-08-18 DIAGNOSIS — I10 ESSENTIAL HYPERTENSION: ICD-10-CM

## 2023-08-18 DIAGNOSIS — K59.00 CONSTIPATION, UNSPECIFIED CONSTIPATION TYPE: ICD-10-CM

## 2023-08-18 DIAGNOSIS — F41.1 GENERALIZED ANXIETY DISORDER: Primary | ICD-10-CM

## 2023-08-18 DIAGNOSIS — R63.4 WEIGHT LOSS: ICD-10-CM

## 2023-08-18 PROCEDURE — 1159F MED LIST DOCD IN RCRD: CPT | Performed by: FAMILY MEDICINE

## 2023-08-18 PROCEDURE — 3074F SYST BP LT 130 MM HG: CPT | Performed by: FAMILY MEDICINE

## 2023-08-18 PROCEDURE — 3078F DIAST BP <80 MM HG: CPT | Performed by: FAMILY MEDICINE

## 2023-08-18 PROCEDURE — 99214 OFFICE O/P EST MOD 30 MIN: CPT | Performed by: FAMILY MEDICINE

## 2023-08-18 PROCEDURE — 1160F RVW MEDS BY RX/DR IN RCRD: CPT | Performed by: FAMILY MEDICINE
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
## 2024-07-13 DIAGNOSIS — I10 ESSENTIAL HYPERTENSION: ICD-10-CM

## 2024-07-13 DIAGNOSIS — E03.9 ACQUIRED HYPOTHYROIDISM: ICD-10-CM

## 2024-07-15 RX ORDER — METOPROLOL SUCCINATE 25 MG/1
25 TABLET, EXTENDED RELEASE ORAL DAILY
Qty: 90 TABLET | Refills: 0 | Status: SHIPPED | OUTPATIENT
Start: 2024-07-15

## 2024-07-15 RX ORDER — LEVOTHYROXINE SODIUM 0.05 MG/1
50 TABLET ORAL DAILY
Qty: 90 TABLET | Refills: 0 | Status: SHIPPED | OUTPATIENT
Start: 2024-07-15

## 2024-08-19 ENCOUNTER — OFFICE VISIT (OUTPATIENT)
Dept: FAMILY MEDICINE CLINIC | Facility: CLINIC | Age: 79
End: 2024-08-19
Payer: MEDICARE

## 2024-08-19 VITALS
DIASTOLIC BLOOD PRESSURE: 84 MMHG | TEMPERATURE: 97.7 F | WEIGHT: 125 LBS | HEART RATE: 70 BPM | OXYGEN SATURATION: 96 % | BODY MASS INDEX: 21.34 KG/M2 | SYSTOLIC BLOOD PRESSURE: 149 MMHG | HEIGHT: 64 IN

## 2024-08-19 DIAGNOSIS — R73.9 HYPERGLYCEMIA: ICD-10-CM

## 2024-08-19 DIAGNOSIS — F41.1 GENERALIZED ANXIETY DISORDER: ICD-10-CM

## 2024-08-19 DIAGNOSIS — R42 DIZZINESS: Primary | ICD-10-CM

## 2024-08-19 DIAGNOSIS — E03.9 ACQUIRED HYPOTHYROIDISM: ICD-10-CM

## 2024-08-19 DIAGNOSIS — R73.09 ELEVATED HEMOGLOBIN A1C: ICD-10-CM

## 2024-08-19 PROCEDURE — 1160F RVW MEDS BY RX/DR IN RCRD: CPT | Performed by: NURSE PRACTITIONER

## 2024-08-19 PROCEDURE — 3079F DIAST BP 80-89 MM HG: CPT | Performed by: NURSE PRACTITIONER

## 2024-08-19 PROCEDURE — 3077F SYST BP >= 140 MM HG: CPT | Performed by: NURSE PRACTITIONER

## 2024-08-19 PROCEDURE — 1159F MED LIST DOCD IN RCRD: CPT | Performed by: NURSE PRACTITIONER

## 2024-08-19 PROCEDURE — 99214 OFFICE O/P EST MOD 30 MIN: CPT | Performed by: NURSE PRACTITIONER

## 2024-08-19 RX ORDER — PAROXETINE 10 MG/1
10 TABLET, FILM COATED ORAL EVERY MORNING
Qty: 90 TABLET | Refills: 0 | Status: SHIPPED | OUTPATIENT
Start: 2024-08-19

## 2024-08-19 NOTE — PROGRESS NOTES
"Chief Complaint  Dizziness        Dorie Moctezuma presents to [facility] INTERNAL MEDICINE        Subjective      79 year old female patient present today with granddaughter, Aditi, to discuss dizziness. PMH - hyperglycemia, HTN, hypothyroidism, GILBERT, insomnia.    With c/o being dizzy for past several years has been worse since she fell last year and hit her head, in which she did not seek medical care.  She wakes up in the mornings feeling dizzy and continues throughout the day. Room does not spin.  Denies nausea or vomiting.  Does get better as day goes on.  She does feel anxious often. Watches the news constantly. She was on paxil in past but stopped.  BP stable. Denies CP.                        Objective         Vital Signs:     /84 (BP Location: Right arm, Patient Position: Sitting, Cuff Size: Adult)   Pulse 70   Temp 97.7 °F (36.5 °C) (Infrared)   Ht 162.6 cm (64.02\")   Wt 56.7 kg (125 lb)   SpO2 96%   BMI 21.45 kg/m²       Physical Exam  Constitutional:       Appearance: She is well-developed.      Comments:      HENT:      Head: Normocephalic and atraumatic.      Nose: Nose normal.      Mouth/Throat:      Mouth: Mucous membranes are moist.      Pharynx: Oropharynx is clear.   Eyes:      Conjunctiva/sclera: Conjunctivae normal.      Pupils: Pupils are equal, round, and reactive to light.   Neck:      Thyroid: No thyroid mass, thyromegaly or thyroid tenderness.   Cardiovascular:      Rate and Rhythm: Normal rate and regular rhythm.      Pulses: Normal pulses.      Heart sounds: Normal heart sounds.   Pulmonary:      Effort: Pulmonary effort is normal.      Breath sounds: Normal breath sounds.   Abdominal:      General: Bowel sounds are normal.      Palpations: Abdomen is soft.   Musculoskeletal:         General: Normal range of motion.      Cervical back: Normal range of motion and neck supple.   Skin:     General: Skin is warm and dry.      Findings: No rash.   Neurological:      " Mental Status: She is alert and oriented to person, place, and time. She is confused.   Psychiatric:         Behavior: Behavior normal. Behavior is cooperative.                History of Present Illness      Patient Active Problem List   Diagnosis    Hyperglycemia    Hyperkalemia    Mixed hyperlipidemia    Essential hypertension    Acquired hypothyroidism    Insomnia    Osteoporosis    Smoker    Breast lump    Chronic hepatitis C without hepatic coma    Generalized anxiety disorder    Dizziness    Elevated hemoglobin A1c         Past Medical History:   Diagnosis Date    Hypertension     Hyperthyroidism           Family History   Problem Relation Age of Onset    Other Sister         bladder cancer    Heart failure Sister     Crohn's disease Sister     Colon cancer Sister     Colon cancer Brother     No Known Problems Brother           Past Surgical History:   Procedure Laterality Date    CHOLECYSTECTOMY            Social History     Socioeconomic History    Marital status: Single   Tobacco Use    Smoking status: Every Day     Current packs/day: 0.50     Average packs/day: 0.5 packs/day for 67.6 years (33.8 ttl pk-yrs)     Types: Cigarettes     Start date: 1957     Passive exposure: Current    Smokeless tobacco: Never   Vaping Use    Vaping status: Never Used   Substance and Sexual Activity    Alcohol use: Never    Drug use: Never    Sexual activity: Not Currently                    Result Review :                                  BMI is within normal parameters. No other follow-up for BMI required.               Assessment and Plan      Diagnoses and all orders for this visit:    1. Dizziness (Primary)  -     CBC & Differential  -     Comprehensive metabolic panel  -     TSH  -     Hemoglobin A1c  -     T3, free  -     T4    2. Hyperglycemia  -     CBC & Differential  -     Comprehensive metabolic panel  -     Hemoglobin A1c    3. Elevated hemoglobin A1c  -     CBC & Differential  -     Comprehensive metabolic  panel  -     Hemoglobin A1c    4. Acquired hypothyroidism  -     CBC & Differential  -     Comprehensive metabolic panel  -     TSH  -     T3, free  -     T4    5. Generalized anxiety disorder  -     PARoxetine (Paxil) 10 MG tablet; Take 1 tablet by mouth Every Morning. With mid-morning medicine  Dispense: 90 tablet; Refill: 0          Patient was last seen by Dr. Sorensen July 2023.  He placed her on Paxil for GILBERT.  She was supposed to follow back up but did not do so.  She continues to smoke has no desire to quit.  Packs will likely help with her anxiety.  She does eat small meals and talks about that continually throughout the visit.  It may increase her appetite as well.  She stresses over watching the news but does not want to watch anything else.  Regarding her dizziness it could be attributed to stress.  Exam was overall negative today.  We will go ahead and draw labs as it has been more than a year to check thyroid panel CBC CMP and A1c.  Want to make sure her A1c is not worse and her thyroid is stable.  Will call granddaughter tomorrow per patient request to discuss lab results.                          Follow Up       Return in about 6 weeks (around 9/30/2024) for with Audrey Whiting NP.      Patient was given instructions and counseling regarding her condition or for health maintenance advice. Please see specific information pulled into the AVS if appropriate.     Nydia Morocho, APRN8/19/202416:44 EDT  This note has been electronically signed

## 2024-08-20 ENCOUNTER — TELEPHONE (OUTPATIENT)
Dept: FAMILY MEDICINE CLINIC | Facility: CLINIC | Age: 79
End: 2024-08-20
Payer: MEDICARE

## 2024-08-20 LAB
ALBUMIN SERPL-MCNC: 4.4 G/DL (ref 3.7–4.7)
ALP SERPL-CCNC: 95 IU/L (ref 44–121)
ALT SERPL-CCNC: 18 IU/L (ref 0–32)
AST SERPL-CCNC: 22 IU/L (ref 0–40)
BASOPHILS # BLD AUTO: 0.1 X10E3/UL (ref 0–0.2)
BASOPHILS NFR BLD AUTO: 1 %
BILIRUB SERPL-MCNC: 0.5 MG/DL (ref 0–1.2)
BUN SERPL-MCNC: 14 MG/DL (ref 8–27)
BUN/CREAT SERPL: 17 (ref 12–28)
CALCIUM SERPL-MCNC: 10.4 MG/DL (ref 8.7–10.3)
CHLORIDE SERPL-SCNC: 92 MMOL/L (ref 96–106)
CO2 SERPL-SCNC: 26 MMOL/L (ref 20–29)
CREAT SERPL-MCNC: 0.82 MG/DL (ref 0.57–1)
EGFRCR SERPLBLD CKD-EPI 2021: 72 ML/MIN/1.73
EOSINOPHIL # BLD AUTO: 0.1 X10E3/UL (ref 0–0.4)
EOSINOPHIL NFR BLD AUTO: 1 %
ERYTHROCYTE [DISTWIDTH] IN BLOOD BY AUTOMATED COUNT: 12 % (ref 11.7–15.4)
GLOBULIN SER CALC-MCNC: 2.6 G/DL (ref 1.5–4.5)
GLUCOSE SERPL-MCNC: 102 MG/DL (ref 70–99)
HBA1C MFR BLD: 5.9 % (ref 4.8–5.6)
HCT VFR BLD AUTO: 43.8 % (ref 34–46.6)
HGB BLD-MCNC: 14.4 G/DL (ref 11.1–15.9)
IMM GRANULOCYTES # BLD AUTO: 0 X10E3/UL (ref 0–0.1)
IMM GRANULOCYTES NFR BLD AUTO: 0 %
LYMPHOCYTES # BLD AUTO: 2.2 X10E3/UL (ref 0.7–3.1)
LYMPHOCYTES NFR BLD AUTO: 23 %
MCH RBC QN AUTO: 30.9 PG (ref 26.6–33)
MCHC RBC AUTO-ENTMCNC: 32.9 G/DL (ref 31.5–35.7)
MCV RBC AUTO: 94 FL (ref 79–97)
MONOCYTES # BLD AUTO: 0.5 X10E3/UL (ref 0.1–0.9)
MONOCYTES NFR BLD AUTO: 5 %
NEUTROPHILS # BLD AUTO: 6.8 X10E3/UL (ref 1.4–7)
NEUTROPHILS NFR BLD AUTO: 70 %
PLATELET # BLD AUTO: 326 X10E3/UL (ref 150–450)
POTASSIUM SERPL-SCNC: 5.8 MMOL/L (ref 3.5–5.2)
PROT SERPL-MCNC: 7 G/DL (ref 6–8.5)
RBC # BLD AUTO: 4.66 X10E6/UL (ref 3.77–5.28)
SODIUM SERPL-SCNC: 132 MMOL/L (ref 134–144)
T3FREE SERPL-MCNC: 2.6 PG/ML (ref 2–4.4)
T4 SERPL-MCNC: 11.5 UG/DL (ref 4.5–12)
TSH SERPL DL<=0.005 MIU/L-ACNC: 1.2 UIU/ML (ref 0.45–4.5)
WBC # BLD AUTO: 9.7 X10E3/UL (ref 3.4–10.8)

## 2024-08-20 NOTE — PROGRESS NOTES
Please call granddaughter and to let her know A1c is stable at 5.9 indicating she is still prediabetic.  Does not require medication management at this time as it has been the same as last check.  She is mindful of carbohydrate intake.  Glucose levels can attribute to some dizziness panel is normal.  She has an elevated potassium level as well.  I would recommend and increasing water intake as this is likely elevated based on decreased fluid consumption.

## 2024-08-20 NOTE — TELEPHONE ENCOUNTER
Name: suzietayla    Relationship: Emergency Contact    Best Callback Number: [phone]     HUB PROVIDED THE RELAY MESSAGE FROM THE OFFICE   PATIENT VOICED UNDERSTANDING AND HAS NO FURTHER QUESTIONS AT THIS TIME    ADDITIONAL INFORMATION:

## 2024-08-20 NOTE — TELEPHONE ENCOUNTER
Attempting to contact daughter, no answer, lmom    Hub relay    Please call granddaughter and to let her know A1c is stable at 5.9 indicating she is still prediabetic.  Does not require medication management at this time as it has been the same as last check.  She is mindful of carbohydrate intake.  Glucose levels can attribute to some dizziness panel is normal.  She has an elevated potassium level as well.  I would recommend and increasing water intake as this is likely elevated based on decreased fluid consumption.

## 2024-08-20 NOTE — TELEPHONE ENCOUNTER
----- Message from Nydia Bailee sent at 8/20/2024  9:56 AM EDT -----  Please call granddaughter and to let her know A1c is stable at 5.9 indicating she is still prediabetic.  Does not require medication management at this time as it has been the same as last check.  She is mindful of carbohydrate intake.  Glucose level  s can attribute to some dizziness panel is normal.  She has an elevated potassium level as well.  I would recommend and increasing water intake as this is likely elevated based on decreased fluid consumption.

## 2024-09-26 DIAGNOSIS — I10 ESSENTIAL HYPERTENSION: ICD-10-CM

## 2024-09-26 DIAGNOSIS — E03.9 ACQUIRED HYPOTHYROIDISM: ICD-10-CM

## 2024-09-26 DIAGNOSIS — F41.1 GENERALIZED ANXIETY DISORDER: ICD-10-CM

## 2024-09-26 RX ORDER — METOPROLOL SUCCINATE 25 MG/1
25 TABLET, EXTENDED RELEASE ORAL DAILY
Qty: 90 TABLET | Refills: 0 | Status: SHIPPED | OUTPATIENT
Start: 2024-09-26

## 2024-09-26 RX ORDER — PAROXETINE 10 MG/1
TABLET, FILM COATED ORAL
Qty: 30 TABLET | Refills: 0 | Status: SHIPPED | OUTPATIENT
Start: 2024-09-26

## 2024-09-26 RX ORDER — LEVOTHYROXINE SODIUM 50 UG/1
50 TABLET ORAL DAILY
Qty: 90 TABLET | Refills: 0 | Status: SHIPPED | OUTPATIENT
Start: 2024-09-26

## 2024-12-24 ENCOUNTER — HOSPITAL ENCOUNTER (INPATIENT)
Facility: HOSPITAL | Age: 79
LOS: 7 days | Discharge: NURSING, REHAB OR LONG-TERM CARE | End: 2024-12-31
Attending: FAMILY MEDICINE | Admitting: FAMILY MEDICINE
Payer: MEDICARE

## 2024-12-24 ENCOUNTER — APPOINTMENT (OUTPATIENT)
Dept: GENERAL RADIOLOGY | Facility: HOSPITAL | Age: 79
End: 2024-12-24
Payer: MEDICARE

## 2024-12-24 DIAGNOSIS — S72.22XA CLOSED DISPLACED SUBTROCHANTERIC FRACTURE OF LEFT FEMUR, INITIAL ENCOUNTER: ICD-10-CM
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
## 2024-12-24 DIAGNOSIS — M25.552 LEFT HIP PAIN: Primary | ICD-10-CM

## 2024-12-24 PROBLEM — S72.002A FRACTURE OF FEMORAL NECK, LEFT: Status: ACTIVE | Noted: 2024-12-24

## 2024-12-24 PROBLEM — D72.829 LEUKOCYTOSIS: Status: ACTIVE | Noted: 2024-12-24

## 2024-12-24 PROBLEM — E87.1 HYPONATREMIA: Status: ACTIVE | Noted: 2024-12-24

## 2024-12-24 LAB
ANION GAP SERPL CALCULATED.3IONS-SCNC: 13.6 MMOL/L (ref 5–15)
BASOPHILS # BLD AUTO: 0.06 10*3/MM3 (ref 0–0.2)
BASOPHILS NFR BLD AUTO: 0.4 % (ref 0–1.5)
BILIRUB UR QL STRIP: NEGATIVE
BUN SERPL-MCNC: 9 MG/DL (ref 8–23)
BUN/CREAT SERPL: 14.8 (ref 7–25)
CALCIUM SPEC-SCNC: 9.4 MG/DL (ref 8.6–10.5)
CHLORIDE SERPL-SCNC: 93 MMOL/L (ref 98–107)
CLARITY UR: ABNORMAL
CO2 SERPL-SCNC: 23.4 MMOL/L (ref 22–29)
COLOR UR: YELLOW
CREAT SERPL-MCNC: 0.61 MG/DL (ref 0.57–1)
DEPRECATED RDW RBC AUTO: 42.3 FL (ref 37–54)
EGFRCR SERPLBLD CKD-EPI 2021: 89.9 ML/MIN/1.73
EOSINOPHIL # BLD AUTO: 0.04 10*3/MM3 (ref 0–0.4)
EOSINOPHIL NFR BLD AUTO: 0.3 % (ref 0.3–6.2)
ERYTHROCYTE [DISTWIDTH] IN BLOOD BY AUTOMATED COUNT: 12.6 % (ref 12.3–15.4)
GLUCOSE SERPL-MCNC: 113 MG/DL (ref 65–99)
GLUCOSE UR STRIP-MCNC: NEGATIVE MG/DL
HCT VFR BLD AUTO: 44.9 % (ref 34–46.6)
HGB BLD-MCNC: 14.7 G/DL (ref 12–15.9)
HGB UR QL STRIP.AUTO: NEGATIVE
IMM GRANULOCYTES # BLD AUTO: 0.07 10*3/MM3 (ref 0–0.05)
IMM GRANULOCYTES NFR BLD AUTO: 0.5 % (ref 0–0.5)
KETONES UR QL STRIP: ABNORMAL
LEUKOCYTE ESTERASE UR QL STRIP.AUTO: NEGATIVE
LYMPHOCYTES # BLD AUTO: 1.06 10*3/MM3 (ref 0.7–3.1)
LYMPHOCYTES NFR BLD AUTO: 7.5 % (ref 19.6–45.3)
MCH RBC QN AUTO: 30.1 PG (ref 26.6–33)
MCHC RBC AUTO-ENTMCNC: 32.7 G/DL (ref 31.5–35.7)
MCV RBC AUTO: 91.8 FL (ref 79–97)
MONOCYTES # BLD AUTO: 0.26 10*3/MM3 (ref 0.1–0.9)
MONOCYTES NFR BLD AUTO: 1.8 % (ref 5–12)
NEUTROPHILS NFR BLD AUTO: 12.62 10*3/MM3 (ref 1.7–7)
NEUTROPHILS NFR BLD AUTO: 89.5 % (ref 42.7–76)
NITRITE UR QL STRIP: NEGATIVE
NRBC BLD AUTO-RTO: 0 /100 WBC (ref 0–0.2)
PH UR STRIP.AUTO: 8 [PH] (ref 5–8)
PLATELET # BLD AUTO: 317 10*3/MM3 (ref 140–450)
PMV BLD AUTO: 10.9 FL (ref 6–12)
POTASSIUM SERPL-SCNC: 4.1 MMOL/L (ref 3.5–5.2)
PROT UR QL STRIP: NEGATIVE
QT INTERVAL: 296 MS
QTC INTERVAL: 417 MS
RBC # BLD AUTO: 4.89 10*6/MM3 (ref 3.77–5.28)
SODIUM SERPL-SCNC: 130 MMOL/L (ref 136–145)
SP GR UR STRIP: 1.01 (ref 1–1.03)
UROBILINOGEN UR QL STRIP: ABNORMAL
WBC NRBC COR # BLD AUTO: 14.11 10*3/MM3 (ref 3.4–10.8)

## 2024-12-24 PROCEDURE — 25010000002 ONDANSETRON PER 1 MG

## 2024-12-24 PROCEDURE — 81003 URINALYSIS AUTO W/O SCOPE: CPT

## 2024-12-24 PROCEDURE — 85025 COMPLETE CBC W/AUTO DIFF WBC: CPT

## 2024-12-24 PROCEDURE — 73502 X-RAY EXAM HIP UNI 2-3 VIEWS: CPT

## 2024-12-24 PROCEDURE — 25010000002 HYDROMORPHONE 1 MG/ML SOLUTION

## 2024-12-24 PROCEDURE — 80048 BASIC METABOLIC PNL TOTAL CA: CPT

## 2024-12-24 PROCEDURE — 93005 ELECTROCARDIOGRAM TRACING: CPT

## 2024-12-24 PROCEDURE — 71045 X-RAY EXAM CHEST 1 VIEW: CPT

## 2024-12-24 PROCEDURE — 99285 EMERGENCY DEPT VISIT HI MDM: CPT

## 2024-12-24 PROCEDURE — 36415 COLL VENOUS BLD VENIPUNCTURE: CPT

## 2024-12-24 PROCEDURE — P9612 CATHETERIZE FOR URINE SPEC: HCPCS

## 2024-12-24 RX ORDER — SODIUM CHLORIDE 9 MG/ML
100 INJECTION, SOLUTION INTRAVENOUS CONTINUOUS
Status: DISPENSED | OUTPATIENT
Start: 2024-12-24 | End: 2024-12-25

## 2024-12-24 RX ORDER — SODIUM CHLORIDE 0.9 % (FLUSH) 0.9 %
10 SYRINGE (ML) INJECTION AS NEEDED
Status: DISCONTINUED | OUTPATIENT
Start: 2024-12-24 | End: 2024-12-31 | Stop reason: HOSPADM

## 2024-12-24 RX ORDER — HYDROMORPHONE HYDROCHLORIDE 1 MG/ML
0.5 INJECTION, SOLUTION INTRAMUSCULAR; INTRAVENOUS; SUBCUTANEOUS EVERY 4 HOURS PRN
Status: DISCONTINUED | OUTPATIENT
Start: 2024-12-24 | End: 2024-12-31 | Stop reason: HOSPADM

## 2024-12-24 RX ORDER — ONDANSETRON 2 MG/ML
4 INJECTION INTRAMUSCULAR; INTRAVENOUS ONCE
Status: COMPLETED | OUTPATIENT
Start: 2024-12-24 | End: 2024-12-24

## 2024-12-24 RX ORDER — ONDANSETRON 2 MG/ML
4 INJECTION INTRAMUSCULAR; INTRAVENOUS EVERY 6 HOURS PRN
Status: DISCONTINUED | OUTPATIENT
Start: 2024-12-24 | End: 2024-12-31 | Stop reason: HOSPADM

## 2024-12-24 RX ADMIN — ONDANSETRON 4 MG: 2 INJECTION INTRAMUSCULAR; INTRAVENOUS at 21:54

## 2024-12-24 RX ADMIN — HYDROMORPHONE HYDROCHLORIDE 0.5 MG: 1 INJECTION, SOLUTION INTRAMUSCULAR; INTRAVENOUS; SUBCUTANEOUS at 21:55

## 2024-12-25 LAB
ANION GAP SERPL CALCULATED.3IONS-SCNC: 9.1 MMOL/L (ref 5–15)
BASOPHILS # BLD AUTO: 0.04 10*3/MM3 (ref 0–0.2)
BASOPHILS NFR BLD AUTO: 0.3 % (ref 0–1.5)
BUN SERPL-MCNC: 8 MG/DL (ref 8–23)
BUN/CREAT SERPL: 12.3 (ref 7–25)
CALCIUM SPEC-SCNC: 9.2 MG/DL (ref 8.6–10.5)
CHLORIDE SERPL-SCNC: 95 MMOL/L (ref 98–107)
CO2 SERPL-SCNC: 25.9 MMOL/L (ref 22–29)
CREAT SERPL-MCNC: 0.65 MG/DL (ref 0.57–1)
DEPRECATED RDW RBC AUTO: 41.6 FL (ref 37–54)
EGFRCR SERPLBLD CKD-EPI 2021: 88.6 ML/MIN/1.73
EOSINOPHIL # BLD AUTO: 0 10*3/MM3 (ref 0–0.4)
EOSINOPHIL NFR BLD AUTO: 0 % (ref 0.3–6.2)
ERYTHROCYTE [DISTWIDTH] IN BLOOD BY AUTOMATED COUNT: 12.4 % (ref 12.3–15.4)
GLUCOSE SERPL-MCNC: 131 MG/DL (ref 65–99)
HCT VFR BLD AUTO: 40.6 % (ref 34–46.6)
HGB BLD-MCNC: 13.6 G/DL (ref 12–15.9)
IMM GRANULOCYTES # BLD AUTO: 0.04 10*3/MM3 (ref 0–0.05)
IMM GRANULOCYTES NFR BLD AUTO: 0.3 % (ref 0–0.5)
LYMPHOCYTES # BLD AUTO: 0.65 10*3/MM3 (ref 0.7–3.1)
LYMPHOCYTES NFR BLD AUTO: 4.9 % (ref 19.6–45.3)
MCH RBC QN AUTO: 30.7 PG (ref 26.6–33)
MCHC RBC AUTO-ENTMCNC: 33.5 G/DL (ref 31.5–35.7)
MCV RBC AUTO: 91.6 FL (ref 79–97)
MONOCYTES # BLD AUTO: 0.31 10*3/MM3 (ref 0.1–0.9)
MONOCYTES NFR BLD AUTO: 2.4 % (ref 5–12)
NEUTROPHILS NFR BLD AUTO: 12.15 10*3/MM3 (ref 1.7–7)
NEUTROPHILS NFR BLD AUTO: 92.1 % (ref 42.7–76)
NRBC BLD AUTO-RTO: 0 /100 WBC (ref 0–0.2)
PLATELET # BLD AUTO: 264 10*3/MM3 (ref 140–450)
PMV BLD AUTO: 10.1 FL (ref 6–12)
POTASSIUM SERPL-SCNC: 4.2 MMOL/L (ref 3.5–5.2)
RBC # BLD AUTO: 4.43 10*6/MM3 (ref 3.77–5.28)
SODIUM SERPL-SCNC: 130 MMOL/L (ref 136–145)
WBC NRBC COR # BLD AUTO: 13.19 10*3/MM3 (ref 3.4–10.8)

## 2024-12-25 PROCEDURE — 25010000002 HYDROMORPHONE PER 4 MG: Performed by: NURSE PRACTITIONER

## 2024-12-25 PROCEDURE — 0SRB049 REPLACEMENT OF LEFT HIP JOINT WITH CERAMIC ON POLYETHYLENE SYNTHETIC SUBSTITUTE, CEMENTED, OPEN APPROACH: ICD-10-PCS | Performed by: ORTHOPAEDIC SURGERY

## 2024-12-25 PROCEDURE — 80048 BASIC METABOLIC PNL TOTAL CA: CPT | Performed by: NURSE PRACTITIONER

## 2024-12-25 PROCEDURE — 25810000003 SODIUM CHLORIDE 0.9 % SOLUTION: Performed by: NURSE PRACTITIONER

## 2024-12-25 PROCEDURE — 25010000002 ONDANSETRON PER 1 MG: Performed by: NURSE PRACTITIONER

## 2024-12-25 PROCEDURE — 85025 COMPLETE CBC W/AUTO DIFF WBC: CPT | Performed by: NURSE PRACTITIONER

## 2024-12-25 RX ORDER — METOPROLOL SUCCINATE 25 MG/1
25 TABLET, EXTENDED RELEASE ORAL DAILY
Status: DISCONTINUED | OUTPATIENT
Start: 2024-12-25 | End: 2024-12-31 | Stop reason: HOSPADM

## 2024-12-25 RX ORDER — LEVOTHYROXINE SODIUM 50 UG/1
50 TABLET ORAL
Status: DISCONTINUED | OUTPATIENT
Start: 2024-12-25 | End: 2024-12-31 | Stop reason: HOSPADM

## 2024-12-25 RX ADMIN — HYDROMORPHONE HYDROCHLORIDE 0.5 MG: 1 INJECTION, SOLUTION INTRAMUSCULAR; INTRAVENOUS; SUBCUTANEOUS at 19:39

## 2024-12-25 RX ADMIN — LEVOTHYROXINE SODIUM 50 MCG: 0.05 TABLET ORAL at 06:13

## 2024-12-25 RX ADMIN — METOPROLOL SUCCINATE 25 MG: 25 TABLET, EXTENDED RELEASE ORAL at 08:40

## 2024-12-25 RX ADMIN — HYDROMORPHONE HYDROCHLORIDE 0.5 MG: 1 INJECTION, SOLUTION INTRAMUSCULAR; INTRAVENOUS; SUBCUTANEOUS at 23:56

## 2024-12-25 RX ADMIN — ONDANSETRON 4 MG: 2 INJECTION INTRAMUSCULAR; INTRAVENOUS at 19:39

## 2024-12-25 RX ADMIN — SODIUM CHLORIDE 100 ML/HR: 9 INJECTION, SOLUTION INTRAVENOUS at 01:59

## 2024-12-25 RX ADMIN — HYDROMORPHONE HYDROCHLORIDE 0.5 MG: 1 INJECTION, SOLUTION INTRAMUSCULAR; INTRAVENOUS; SUBCUTANEOUS at 06:13

## 2024-12-25 RX ADMIN — ONDANSETRON 4 MG: 2 INJECTION INTRAMUSCULAR; INTRAVENOUS at 06:13

## 2024-12-25 NOTE — PROGRESS NOTES
[facility] MEDICINE SERVICE  DAILY PROGRESS NOTE    NAME: Dorie Moctezuma  : 1945  MRN: [id number]      LOS: 1 day     PROVIDER OF SERVICE: Milton Espinosa MD    Chief Complaint: Fracture of femoral neck, left    Subjective:     Interval History:  History taken from: patient    History of Present Illness: Dorie Moctezuma is a 79 y.o. female with a CMH of hyperlipidemia, hypertension, anxiety and depression, hypothyroidism osteoporosis who presented to [facility] on 2024 with left hip pain after mechanical fall at home.  Reports family dog jumped on her and caused her to fall onto her left hip.  She denies hitting her head or loss of consciousness she denies any other injury or precipitating factor for the fall.  She is awake and alert and answering appropriately.  Family at bedside.  X-ray hip and pelvis per radiology shows left femoral neck fracture.  Chest x-ray was negative per radiology, EKG shows normal sinus rhythm heart rate 71, labs show sodium of 130 chloride 93 glucose 113 WBC 14.11 and afebrile, urinalysis ordered and pending.  Orthopedics was consulted from the emergency department and advised she may not have surgery until Thursday given the holiday.     2024 patient seen and examined in bed no acute distress, vital signs stable, pain well-controlled, discussed with RN.       Review of Systems  Constitutional: Negative.    HENT: Negative.     Eyes: Negative.    Respiratory: Negative.     Cardiovascular: Negative.    Gastrointestinal: Negative.    Endocrine: Negative.    Genitourinary: Negative.    Musculoskeletal:  Positive for arthralgias.   Skin: Negative.    Allergic/Immunologic: Negative.    Neurological: Negative.    Hematological: Negative.    Psychiatric/Behavioral: Negative.     All other systems reviewed and are negative.  Objective:     Vital Signs  Temp:  [97.7 °F (36.5 °C)-98.3 °F (36.8 °C)] 97.7 °F (36.5 °C)  Heart Rate:  [68-83] 72  Resp:   [18-19] 19  BP: (113-185)/(64-84) 136/75   Body mass index is 23.08 kg/m².      Physical Exam       Appearance: Normal appearance. She is normal weight.   HENT:      Head: Normocephalic and atraumatic.      Right Ear: External ear normal.      Left Ear: External ear normal.      Nose: Nose normal.      Mouth/Throat:      Mouth: Mucous membranes are moist.   Eyes:      Extraocular Movements: Extraocular movements intact.   Cardiovascular:      Rate and Rhythm: Normal rate and regular rhythm.      Pulses: Normal pulses.      Heart sounds: Normal heart sounds.   Pulmonary:      Effort: Pulmonary effort is normal.      Breath sounds: Normal breath sounds.   Abdominal:      Palpations: Abdomen is soft.   Genitourinary:     Comments: deferred  Musculoskeletal:      Cervical back: Normal range of motion and neck supple.      Comments: ROM LLE limited due to fracture    Skin:     General: Skin is warm and dry.   Neurological:      General: No focal deficit present.      Mental Status: She is alert and oriented to person, place, and time.   Psychiatric:         Mood and Affect: Mood normal.         Behavior: Behavior normal.         Thought Content: Thought content normal.         Judgment: Judgment normal.            Diagnostic Data    Results from last 7 days   Lab Units 12/25/24  0545   WBC 10*3/mm3 13.19*   HEMOGLOBIN g/dL 13.6   HEMATOCRIT % 40.6   PLATELETS 10*3/mm3 264   GLUCOSE mg/dL 131*   CREATININE mg/dL 0.65   BUN mg/dL 8   SODIUM mmol/L 130*   POTASSIUM mmol/L 4.2   ANION GAP mmol/L 9.1       XR Chest 1 View    Result Date: 12/24/2024  No acute cardiopulmonary findings. Electronically Signed: Justo Talbert MD  12/24/2024 10:58 PM EST  Workstation ID: [id number]    XR Hip With or Without Pelvis 2 - 3 View Left    Result Date: 12/24/2024  Impression: Left femoral neck fracture Electronically Signed: Adarsh Burgos  12/24/2024 10:28 PM EST  Workstation ID: [id number]       I reviewed the patient's new clinical  results.    Assessment/Plan:     Active and Resolved Problems  Active Hospital Problems    Diagnosis  POA    **Fracture of femoral neck, left [S72.002A]  Yes    Hyponatremia [E87.1]  Yes    Leukocytosis [D72.829]  Yes    Generalized anxiety disorder [F41.1]  Yes    Essential hypertension [I10]  Yes    Mixed hyperlipidemia [E78.2]  Yes    Acquired hypothyroidism [E03.9]  Yes      Resolved Hospital Problems   No resolved problems to display.       Left femoral neck status post mechanical fall, orthopedics consulted, 0.5 mg Dilaudid every 4 hours as needed, house diet until needs to be made n.p.o. for surgery likely not till tomorrow per surgery     Hyponatremia, sodium 130, saline IV infusion monitor BMP, medications reviewed     Leukocytosis, WBC 14 afebrile chest x-ray and urinalysis negative for infection may be reactive     Generalized anxiety disorder, no home meds     Essential hypertension, reordered home metoprolol     Hyperlipidemia, not on statin     Acquired hypothyroidism, reordered home levothyroxine    VTE Prophylaxis:  No VTE prophylaxis order currently exists.    Disposition Planning:   Barriers to Discharge: Femoral neck fracture  Anticipated Date of Discharge: 12/28  Place of Discharge: NH      Time: 30 minutes     Code Status and Medical Interventions: CPR (Attempt to Resuscitate); Full Support   Ordered at: 12/24/24 7940     Code Status (Patient has no pulse and is not breathing):    CPR (Attempt to Resuscitate)     Medical Interventions (Patient has pulse or is breathing):    Full Support       Signature: Electronically signed by Milton Espinosa MD, 12/25/24, 14:20 EST.  [facility] Hospitalist Team

## 2024-12-25 NOTE — PLAN OF CARE
Problem: Adult Inpatient Plan of Care  Goal: Absence of Hospital-Acquired Illness or Injury  Intervention: Prevent Skin Injury  Recent Flowsheet Documentation  Taken 12/25/2024 1617 by Evon Mcclure RN  Body Position: (pt refused repositioning)   position maintained   other (see comments)  Taken 12/25/2024 1152 by Evon Mcclure RN  Body Position:   turned   right  Taken 12/25/2024 0840 by Evon Mcclure RN  Body Position: neutral body alignment  Skin Protection:   transparent dressing maintained   incontinence pads utilized     Problem: Adult Inpatient Plan of Care  Goal: Optimal Comfort and Wellbeing  Outcome: Not Progressing  Intervention: Provide Person-Centered Care  Recent Flowsheet Documentation  Taken 12/25/2024 0840 by Evon Mcclure RN  Trust Relationship/Rapport:   care explained   choices provided   Goal Outcome Evaluation:                      Pt refusing repositioning and pain meds throughout shift. C/o discomfort from sitting in same spot and allowed repositioning one time. Patient is very confused on situation and requires reorienting frequently. Family currently at bedside.

## 2024-12-25 NOTE — ED PROVIDER NOTES
Subjective   History of Present Illness  79-year-old female with history of hypertension presents the ED after mechanical fall.  She reports that the dog jumped on her and caused her to fall onto her left hip.  She denies losing consciousness or hitting her head.  Also denies chest pain, shortness of breath, UTI symptoms, abdominal pain, nausea or vomiting, numbness or tingling, fever, pain anywhere else.    PCP: Franchesca        Review of Systems   Constitutional:  Negative for fever.   Respiratory:  Negative for shortness of breath.    Musculoskeletal:  Positive for arthralgias and myalgias. Negative for back pain.       Past Medical History:   Diagnosis Date    Hypertension     Hyperthyroidism        Allergies   Allergen Reactions    Benzocaine Rash    Latex, Natural Rubber Rash       Past Surgical History:   Procedure Laterality Date    CHOLECYSTECTOMY         Family History   Problem Relation Age of Onset    Other Sister         bladder cancer    Heart failure Sister     Crohn's disease Sister     Colon cancer Sister     Colon cancer Brother     No Known Problems Brother        Social History     Socioeconomic History    Marital status: Single   Tobacco Use    Smoking status: Every Day     Current packs/day: 0.50     Average packs/day: 0.5 packs/day for 68.0 years (34.0 ttl pk-yrs)     Types: Cigarettes     Start date: 1957     Passive exposure: Current    Smokeless tobacco: Never   Vaping Use    Vaping status: Never Used   Substance and Sexual Activity    Alcohol use: Never    Drug use: Never    Sexual activity: Not Currently           Objective   Physical Exam  Vitals reviewed.   HENT:      Head: Normocephalic.   Eyes:      Extraocular Movements: Extraocular movements intact.      Conjunctiva/sclera: Conjunctivae normal.      Pupils: Pupils are equal, round, and reactive to light.   Cardiovascular:      Rate and Rhythm: Normal rate and regular rhythm.      Pulses: Normal pulses.      Heart sounds: Normal heart  "sounds.   Pulmonary:      Effort: Pulmonary effort is normal.      Breath sounds: Normal breath sounds.   Abdominal:      General: Bowel sounds are normal.      Palpations: Abdomen is soft.      Tenderness: There is no abdominal tenderness.   Musculoskeletal:      Cervical back: Normal range of motion. No tenderness.      Right lower leg: No edema.      Left lower leg: No edema.      Comments: Patient has significant tenderness to the left hip with palpation or any movement.  Mild shortening of the left leg compared to the right.  Distally neurovascularly intact of the left lower extremity   Skin:     General: Skin is warm and dry.   Neurological:      Mental Status: She is alert and oriented to person, place, and time.   Psychiatric:         Mood and Affect: Mood normal.         Behavior: Behavior normal.         Procedures    EKG independently interpreted by Dr. Cervantes is sinus rhythm at a rate of 71 as compared to previous from 7/30/2021 that was sinus rhythm at a rate of 61           ED Course  ED Course as of 12/24/24 2341   Tue Dec 24, 2024   2252 Call placed ortho, awaiting call back [KB]   2252 Discussed with Dr. Juarez with ortho who agreed with the above workup and hospital admission and states surgery will likely be 12/26. No other orders at this time [KB]   2316 On reassessment, patient reports that the pain medicine has been moderately effective and states that the pain is better without movement [KB]   2325 Spoke with Erica PURI with the hospitalist group who agrees to admit patient [KB]      ED Course User Index  [KB] Jared Bergeron APRN      /84   Pulse 73   Temp 97.8 °F (36.6 °C) (Oral)   Resp 18   Ht 162.6 cm (64\")   LMP  (LMP Unknown)   SpO2 93%   BMI 21.46 kg/m²   Labs Reviewed   BASIC METABOLIC PANEL - Abnormal; Notable for the following components:       Result Value    Glucose 113 (*)     Sodium 130 (*)     Chloride 93 (*)     All other components within normal limits    " Narrative:     GFR Categories in Chronic Kidney Disease (CKD)      GFR Category          GFR (mL/min/1.73)    Interpretation  G1                     90 or greater         Normal or high (1)  G2                      60-89                Mild decrease (1)  G3a                   45-59                Mild to moderate decrease  G3b                   30-44                Moderate to severe decrease  G4                    15-29                Severe decrease  G5                    14 or less           Kidney failure          (1)In the absence of evidence of kidney disease, neither GFR category G1 or G2 fulfill the criteria for CKD.    eGFR calculation 2021 CKD-EPI creatinine equation, which does not include race as a factor   CBC WITH AUTO DIFFERENTIAL - Abnormal; Notable for the following components:    WBC 14.11 (*)     Neutrophil % 89.5 (*)     Lymphocyte % 7.5 (*)     Monocyte % 1.8 (*)     Neutrophils, Absolute 12.62 (*)     Immature Grans, Absolute 0.07 (*)     All other components within normal limits   URINALYSIS W/ MICROSCOPIC IF INDICATED (NO CULTURE)   BASIC METABOLIC PANEL   CBC WITH AUTO DIFFERENTIAL   CBC AND DIFFERENTIAL    Narrative:     The following orders were created for panel order CBC & Differential.  Procedure                               Abnormality         Status                     ---------                               -----------         ------                     CBC Auto Differential[[id number]]        Abnormal            Final result                 Please view results for these tests on the individual orders.   CBC AND DIFFERENTIAL    Narrative:     The following orders were created for panel order CBC & Differential.  Procedure                               Abnormality         Status                     ---------                               -----------         ------                     CBC Auto Differential[[id number]]                                                         Please view  results for these tests on the individual orders.     Medications   sodium chloride 0.9 % flush 10 mL (has no administration in time range)   HYDROmorphone (DILAUDID) injection 0.5 mg (has no administration in time range)   ondansetron (ZOFRAN) injection 4 mg (has no administration in time range)   sodium chloride 0.9 % infusion (has no administration in time range)   HYDROmorphone (DILAUDID) injection 0.5 mg (0.5 mg Intravenous Given 12/24/24 2155)   ondansetron (ZOFRAN) injection 4 mg (4 mg Intravenous Given 12/24/24 2154)     XR Chest 1 View    Result Date: 12/24/2024  No acute cardiopulmonary findings. Electronically Signed: Justo Talbert MD  12/24/2024 10:58 PM EST  Workstation ID: [id number]    XR Hip With or Without Pelvis 2 - 3 View Left    Result Date: 12/24/2024  Impression: Left femoral neck fracture Electronically Signed: Adarsh Burgos  12/24/2024 10:28 PM EST  Workstation ID: [id number]                                                    Medical Decision Making  Patient was seen for the above complaints.  IV was established to obtain baseline labs and to give pain medication.  Electrolytes fairly within normal limits, white blood cell count 14.11 without known infection, hemoglobin 14.7.  X-ray of the left hip independently interpreted by the radiologist as left femoral neck fracture.  Chest x-ray independently interpreted by the radiologist as no acute cardiopulmonary findings.  UA is pending at time of admission, this was obtained to rule out UTI.  Discussed case with Dr. Juarez who agrees with inpatient admission.  Patient was given Dilaudid and Zofran during ER course and reports moderate relief of pain with this.  Did not report any pain anywhere else.  Considered head CT however patient reports she did not hit her head.  Patient be admitted to the hospitalist service for further evaluation and management.  Discussed with rEica PURI who agrees to admit patient.  Discussed plan of care with patient and  family bedside who verbalized understanding were agreeable with plan of care at this time.    Based on the clinical findings at this time I anticipate the patient will require a 2 midnight stay    Problems Addressed:  Closed displaced subtrochanteric fracture of left femur, initial encounter: acute illness or injury  Fall, initial encounter: acute illness or injury  Left hip pain: acute illness or injury    Amount and/or Complexity of Data Reviewed  Labs: ordered. Decision-making details documented in ED Course.  Radiology: ordered and independent interpretation performed. Decision-making details documented in ED Course.  ECG/medicine tests: ordered and independent interpretation performed. Decision-making details documented in ED Course.    Risk  Prescription drug management.  Decision regarding hospitalization.        Final diagnoses:   Left hip pain   Fall, initial encounter   Closed displaced subtrochanteric fracture of left femur, initial encounter       ED Disposition  ED Disposition       ED Disposition   Decision to Admit    Condition   --    Comment   Level of Care: Med/Surg [1]   Diagnosis: Fracture of femoral neck, left [[id number]]   Admitting Physician: NATE PERRY [[id number]]   Attending Physician: NATE PERRY [[id number]]   Bed Request Comments: cardiac monitor   Certification: I Certify That Inpatient Hospital Services Are Medically Necessary For Greater Than 2 Midnights                 No follow-up provider specified.       Medication List      No changes were made to your prescriptions during this visit.            Jared Bergeron, APRN  12/24/24 3076

## 2024-12-25 NOTE — H&P
[facility] Medicine Services  History & Physical    Patient Name: Dorie Moctezuma  : 1945  MRN: [id number]  Primary Care Physician:  Radha Sorensen MD  Date of admission: 2024  Date and Time of Service: 2024 at 2340    Subjective      Chief Complaint: Left hip pain post fall    History of Present Illness: Dorie Moctezuma is a 79 y.o. female with a CMH of hyperlipidemia, hypertension, anxiety and depression, hypothyroidism osteoporosis who presented to [facility] on 2024 with left hip pain after mechanical fall at home.  Reports family dog jumped on her and caused her to fall onto her left hip.  She denies hitting her head or loss of consciousness she denies any other injury or precipitating factor for the fall.  She is awake and alert and answering appropriately.  Family at bedside.  X-ray hip and pelvis per radiology shows left femoral neck fracture.  Chest x-ray was negative per radiology, EKG shows normal sinus rhythm heart rate 71, labs show sodium of 130 chloride 93 glucose 113 WBC 14.11 and afebrile, urinalysis ordered and pending.  Orthopedics was consulted from the emergency department and advised she may not have surgery until Thursday given the holiday.          Review of Systems   Constitutional: Negative.    HENT: Negative.     Eyes: Negative.    Respiratory: Negative.     Cardiovascular: Negative.    Gastrointestinal: Negative.    Endocrine: Negative.    Genitourinary: Negative.    Musculoskeletal:  Positive for arthralgias.   Skin: Negative.    Allergic/Immunologic: Negative.    Neurological: Negative.    Hematological: Negative.    Psychiatric/Behavioral: Negative.     All other systems reviewed and are negative.      Personal History     Past Medical History:   Diagnosis Date    Hypertension     Hyperthyroidism        Past Surgical History:   Procedure Laterality Date    CHOLECYSTECTOMY         Family History: family history includes Colon  cancer in her brother and sister; Crohn's disease in her sister; Heart failure in her sister; No Known Problems in her brother; Other in her sister. Otherwise pertinent FHx was reviewed and not pertinent to current issue.    Social History:  reports that she has been smoking cigarettes. She started smoking about 68 years ago. She has a 34 pack-year smoking history. She has been exposed to tobacco smoke. She has never used smokeless tobacco. She reports that she does not drink alcohol and does not use drugs.    Home Medications:  Prior to Admission Medications       Prescriptions Last Dose Informant Patient Reported? Taking?    levothyroxine (SYNTHROID, LEVOTHROID) 50 MCG tablet   No No    Take 1 tablet by mouth once daily    metoprolol succinate XL (TOPROL-XL) 25 MG 24 hr tablet   No No    Take 1 tablet by mouth once daily    PARoxetine (PAXIL) 10 MG tablet   No No    TAKE 1 TABLET BY MOUTH ONCE DAILY IN THE MORNING WITH  MID-MORNING  MEDICINE              Allergies:  Allergies   Allergen Reactions    Benzocaine Rash    Latex, Natural Rubber Rash       Objective      Vitals:   Temp:  [97.8 °F (36.6 °C)-98.1 °F (36.7 °C)] 98.1 °F (36.7 °C)  Heart Rate:  [68-83] 83  Resp:  [18] 18  BP: (144-185)/(75-84) 145/78  Body mass index is 21.46 kg/m².  Physical Exam  Vitals reviewed.   Constitutional:       Appearance: Normal appearance. She is normal weight.   HENT:      Head: Normocephalic and atraumatic.      Right Ear: External ear normal.      Left Ear: External ear normal.      Nose: Nose normal.      Mouth/Throat:      Mouth: Mucous membranes are moist.   Eyes:      Extraocular Movements: Extraocular movements intact.   Cardiovascular:      Rate and Rhythm: Normal rate and regular rhythm.      Pulses: Normal pulses.      Heart sounds: Normal heart sounds.   Pulmonary:      Effort: Pulmonary effort is normal.      Breath sounds: Normal breath sounds.   Abdominal:      Palpations: Abdomen is soft.   Genitourinary:      Comments: deferred  Musculoskeletal:      Cervical back: Normal range of motion and neck supple.      Comments: ROM LLE limited due to fracture    Skin:     General: Skin is warm and dry.   Neurological:      General: No focal deficit present.      Mental Status: She is alert and oriented to person, place, and time.   Psychiatric:         Mood and Affect: Mood normal.         Behavior: Behavior normal.         Thought Content: Thought content normal.         Judgment: Judgment normal.         Diagnostic Data:  Lab Results (last 24 hours)       Procedure Component Value Units Date/Time    Urinalysis With Microscopic If Indicated (No Culture) - Straight Cath [[id number]]  (Abnormal) Collected: 12/24/24 2331    Specimen: Urine from Straight Cath Updated: 12/24/24 2356     Color, UA Yellow     Appearance, UA Hazy     pH, UA 8.0     Specific Gravity, UA 1.011     Glucose, UA Negative     Ketones, UA 15 mg/dL (1+)     Bilirubin, UA Negative     Blood, UA Negative     Protein, UA Negative     Leuk Esterase, UA Negative     Nitrite, UA Negative     Urobilinogen, UA 0.2 E.U./dL    Narrative:      Urine microscopic not indicated.    Basic Metabolic Panel [[id number]]  (Abnormal) Collected: 12/24/24 2246    Specimen: Blood from Arm, Left Updated: 12/24/24 2316     Glucose 113 mg/dL      BUN 9 mg/dL      Creatinine 0.61 mg/dL      Sodium 130 mmol/L      Potassium 4.1 mmol/L      Chloride 93 mmol/L      CO2 23.4 mmol/L      Calcium 9.4 mg/dL      BUN/Creatinine Ratio 14.8     Anion Gap 13.6 mmol/L      eGFR 89.9 mL/min/1.73     Narrative:      GFR Categories in Chronic Kidney Disease (CKD)      GFR Category          GFR (mL/min/1.73)    Interpretation  G1                     90 or greater         Normal or high (1)  G2                      60-89                Mild decrease (1)  G3a                   45-59                Mild to moderate decrease  G3b                   30-44                Moderate to severe decrease  G4                     15-29                Severe decrease  G5                    14 or less           Kidney failure          (1)In the absence of evidence of kidney disease, neither GFR category G1 or G2 fulfill the criteria for CKD.    eGFR calculation 2021 CKD-EPI creatinine equation, which does not include race as a factor    CBC & Differential [[id number]]  (Abnormal) Collected: 12/24/24 2150    Specimen: Blood Updated: 12/24/24 2154    Narrative:      The following orders were created for panel order CBC & Differential.  Procedure                               Abnormality         Status                     ---------                               -----------         ------                     CBC Auto Differential[[id number]]        Abnormal            Final result                 Please view results for these tests on the individual orders.    CBC Auto Differential [[id number]]  (Abnormal) Collected: 12/24/24 2150    Specimen: Blood Updated: 12/24/24 2154     WBC 14.11 10*3/mm3      RBC 4.89 10*6/mm3      Hemoglobin 14.7 g/dL      Hematocrit 44.9 %      MCV 91.8 fL      MCH 30.1 pg      MCHC 32.7 g/dL      RDW 12.6 %      RDW-SD 42.3 fl      MPV 10.9 fL      Platelets 317 10*3/mm3      Neutrophil % 89.5 %      Lymphocyte % 7.5 %      Monocyte % 1.8 %      Eosinophil % 0.3 %      Basophil % 0.4 %      Immature Grans % 0.5 %      Neutrophils, Absolute 12.62 10*3/mm3      Lymphocytes, Absolute 1.06 10*3/mm3      Monocytes, Absolute 0.26 10*3/mm3      Eosinophils, Absolute 0.04 10*3/mm3      Basophils, Absolute 0.06 10*3/mm3      Immature Grans, Absolute 0.07 10*3/mm3      nRBC 0.0 /100 WBC              Imaging Results (Last 24 Hours)       Procedure Component Value Units Date/Time    XR Chest 1 View [[id number]] Collected: 12/24/24 2257     Updated: 12/24/24 2301    Narrative:      XR CHEST 1 VW    Date of Exam: 12/24/2024 10:45 PM EST    Indication: preop for hip fracture.    Comparison: 7/30/2021    Findings:  Cardiac and  mediastinal contours are normal. There is atherosclerotic disease in the aorta. Pulmonary vascularity is normal. The lungs are clear. No pneumothorax. There is degenerative disease in the right shoulder. There is mild thoracic scoliosis.      Impression:      No acute cardiopulmonary findings.      Electronically Signed: Justo Talbert MD    12/24/2024 10:58 PM EST    Workstation ID: [id number]    XR Hip With or Without Pelvis 2 - 3 View Left [[id number]] Collected: 12/24/24 2227     Updated: 12/24/24 2231    Narrative:      XR HIP W OR WO PELVIS 2-3 VIEW LEFT    Date of Exam: 12/24/2024 10:16 PM EST    Indication: left hip pain s/p mechanical fall    Comparison: None available.    Findings:  There is a mildly displaced fracture of the left femoral neck. The femoral head is not dislocated. No pelvic fracture is demonstrated      Impression:      Impression:  Left femoral neck fracture      Electronically Signed: Adarsh Figueroanes    12/24/2024 10:28 PM EST    Workstation ID: [id number]              Assessment & Plan        This is a 79 y.o. female with:    Active and Resolved Problems  Active Hospital Problems    Diagnosis  POA    **Fracture of femoral neck, left [S72.002A]  Yes     Priority: High    Hyponatremia [E87.1]  Yes     Priority: Medium    Leukocytosis [D72.829]  Yes     Priority: Medium    Generalized anxiety disorder [F41.1]  Yes    Essential hypertension [I10]  Yes    Mixed hyperlipidemia [E78.2]  Yes    Acquired hypothyroidism [E03.9]  Yes      Resolved Hospital Problems   No resolved problems to display.       Left femoral neck status post mechanical fall, orthopedics consulted, 0.5 mg Dilaudid every 4 hours as needed, house diet until needs to be made n.p.o. for surgery likely not till tomorrow per surgery    Hyponatremia, sodium 130, saline IV infusion monitor BMP, medications reviewed    Leukocytosis, WBC 14 afebrile chest x-ray and urinalysis negative for infection may be reactive    Generalized anxiety  disorder, no home meds    Essential hypertension, reordered home metoprolol    Hyperlipidemia, not on statin    Acquired hypothyroidism, reordered home levothyroxine    VTE Prophylaxis:  No VTE prophylaxis order currently exists.        The patient desires to be as follows:    CODE STATUS:    Code Status (Patient has no pulse and is not breathing): CPR (Attempt to Resuscitate)  Medical Interventions (Patient has pulse or is breathing): Full Support          Admission Status:  I believe this patient meets inpatient  status.    Expected Length of Stay: pending clinical course     PDMP and Medication Dispenses via Sidebar reviewed and consistent with patient reported medications.    I discussed the patient's findings and my recommendations with patient and family.      Signature:     This document has been electronically signed by JAXSON Castanon on December 25, 2024 03:39 [facility] Hospitalist Team

## 2024-12-25 NOTE — CONSULTS
Orthopaedic Surgery  Hip Fracture Consult Note  Dr. SHEILA Juarez II  [phone]    HPI:  Patient is a 79 y.o. Not  or  female who presents with hip pain after a fall from standing.  They presented to the ER for further workup where a left Femoral Neck Fracture was found. I was consulted for further management. She has been unable to mobilize.  She describes sharp pain in the left hip worse with any activity.  She does state that she has had a hard time eating recently and may be a little malnourished.    MEDICAL HISTORY  Past Medical History:   Diagnosis Date   • Hypertension    • Hyperthyroidism      Past Surgical History:   Procedure Laterality Date   • CHOLECYSTECTOMY       Prior to Admission medications    Medication Sig Start Date End Date Taking? Authorizing Provider   levothyroxine (SYNTHROID, LEVOTHROID) 50 MCG tablet Take 1 tablet by mouth once daily 9/26/24  Yes Radha Sroensen MD   metoprolol succinate XL (TOPROL-XL) 25 MG 24 hr tablet Take 1 tablet by mouth once daily 9/26/24  Yes Radha Sorensen MD   PARoxetine (PAXIL) 10 MG tablet TAKE 1 TABLET BY MOUTH ONCE DAILY IN THE MORNING WITH  MID-MORNING  MEDICINE  Patient not taking: Reported on 12/24/2024 9/26/24   Radha Sorensen MD   busPIRone (BUSPAR) 5 MG tablet Take 1 tablet by mouth 3 (Three) Times a Day. 8/2/21 8/24/21  Radha Sorensen MD     Allergies   Allergen Reactions   • Benzocaine Rash   • Latex, Natural Rubber Rash     Most Recent Immunizations   Administered Date(s) Administered   • COVID-19 (ADDISON) 03/08/2021   • FLUAD TRI 65YR+ 11/14/2006   • PEDS-Pneumococcal Conjugate (PCV7) 11/09/2010   • Pneumococcal Conjugate 13-Valent (PCV13) 10/08/2019   • Pneumococcal Polysaccharide (PPSV23) 10/30/2003   • Tdap 08/24/2021   • Tetanus Toxoid, Unspecified 06/05/2013     Social History   History  Tobacco Use  •  Smoking status:  Every Day      Current packs/day:  0.50      Average packs/day:  0.5   DISCHARGE SUMMARY  This is a  male born on 2021 at a gestational age of Gestational Age: 37w2d. Infant remains hospitalized for: discharge home today after carseat challenge    [place] Information:         Birthweight 4lb 15.7oz  (2.26kg)  Birth Length: 1' 6\" (0.457 m)   Birth Head Circumference: 33.5 cm (13.19\")   Discharge Weight - Scale: 4 lb 12.5 oz (2.169 kg)  Percent Weight Change Since Birth: -4.04%   Delivery Method: Vaginal, Spontaneous  APGAR One: 9  APGAR Five: 9  APGAR Ten: N/A              Feeding Method: Bottle    Recent Labs:   Admission on 2019   Component Date Value Ref Range Status    ABO/Rh 2019 O POS   Final    REGINA IgG 2019 NEG   Final    Amphetamine Screen, Urine 2019 NOT DETECTED  Negative <1000 ng/mL Final    Barbiturate Screen, Ur 2019 NOT DETECTED  Negative < 200 ng/mL Final    Benzodiazepine Screen, Urine 2019 NOT DETECTED  Negative < 200 ng/mL Final    Cannabinoid Scrn, Ur 2019 NOT DETECTED  Negative < 50ng/mL Final    Cocaine Metabolite Screen, Urine 2019 NOT DETECTED  Negative < 300 ng/mL Final    Opiate Scrn, Ur 2019 NOT DETECTED  Negative < 300ng/mL Final    PCP Screen, Urine 2019 NOT DETECTED  Negative < 25 ng/mL Final    Methadone Screen, Urine 2019 NOT DETECTED  Negative <300 ng/mL Final    Propoxyphene Scrn, Ur 2019 NOT DETECTED  Negative <300 ng/mL Final    Meter Glucose 2019 77  70 - 110 mg/dL Final    Meter Glucose 2019 83  70 - 110 mg/dL Final    Meter Glucose 2019 56* 70 - 110 mg/dL Final    Meter Glucose 2019 86  70 - 110 mg/dL Final      Immunization History   Administered Date(s) Administered    Hepatitis B Ped/Adol (Engerix-B, Recombivax HB) 2019       Maternal Labs:    Information for the patient's mother:  Ino Myers [[id number]]   No results found for: RPR, RUBELLAIGGQT, HEPBSAG, HIV1X2    Group B Strep: unknown  Maternal Blood "packs/day for 68.0 years (34.0 ttl pk-yrs)      Types:  Cigarettes      Start date:  1959      Passive exposure:  Current  •  Smokeless tobacco:  Never  Substance Use Topics  •  Alcohol use:  Never       Social History   History  Substance and Sexual Activity  Drug Use  Never        VITALS: /72 (BP Location: Left arm, Patient Position: Lying)   Pulse 71   Temp 98.3 °F (36.8 °C) (Oral)   Resp 18   Ht 162.6 cm (64\")   Wt 61 kg (134 lb 7.7 oz)   LMP  (LMP Unknown)   SpO2 93%   BMI 23.08 kg/m²  Body mass index is 23.08 kg/m².    PHYSICAL EXAM:   CONSTITUTIONAL: No acute distress  LUNGS: Equal chest rise, no shortness of air  CARDIOVASCULAR: palpable peripheral pulses  SKIN: no skin lesions in the area examined  LYMPH: no lymphadenopathy in the area examined  Left Lower Extremity  Tenderness to Palpation: Tenderness to palpation at the hip  Pulses:  Palpable DP/PT pulses  Sensation: Sensation intact to light touch to saphenous/sural/deep peroneal/superficial peroneal/tibial nerves  Motor: 5 out of 5 EHL/FHL/TA/GS motor complexes  Range of Motion: Range of motion of hip deferred secondary to pain.  Positive pain with passive leg roll    RADIOLOGY REVIEW:   XR Chest 1 View    Result Date: 12/24/2024  No acute cardiopulmonary findings. Electronically Signed: Justo Talbert MD  12/24/2024 10:58 PM EST  Workstation ID: [id number]    XR Hip With or Without Pelvis 2 - 3 View Left    Result Date: 12/24/2024  Impression: Left femoral neck fracture Electronically Signed: Adarsh Burgos  12/24/2024 10:28 PM EST  Workstation ID: [id number]      LABS:   Recent Results (from the past 24 hours)   CBC Auto Differential    Collection Time: 12/24/24  9:50 PM    Specimen: Blood   Result Value Ref Range    WBC 14.11 (H) 3.40 - 10.80 10*3/mm3    RBC 4.89 3.77 - 5.28 10*6/mm3    Hemoglobin 14.7 12.0 - 15.9 g/dL    Hematocrit 44.9 34.0 - 46.6 %    MCV 91.8 79.0 - 97.0 fL    MCH 30.1 26.6 - 33.0 pg    MCHC 32.7 31.5 - 35.7 g/dL    RDW " 12.6 12.3 - 15.4 %    RDW-SD 42.3 37.0 - 54.0 fl    MPV 10.9 6.0 - 12.0 fL    Platelets 317 140 - 450 10*3/mm3    Neutrophil % 89.5 (H) 42.7 - 76.0 %    Lymphocyte % 7.5 (L) 19.6 - 45.3 %    Monocyte % 1.8 (L) 5.0 - 12.0 %    Eosinophil % 0.3 0.3 - 6.2 %    Basophil % 0.4 0.0 - 1.5 %    Immature Grans % 0.5 0.0 - 0.5 %    Neutrophils, Absolute 12.62 (H) 1.70 - 7.00 10*3/mm3    Lymphocytes, Absolute 1.06 0.70 - 3.10 10*3/mm3    Monocytes, Absolute 0.26 0.10 - 0.90 10*3/mm3    Eosinophils, Absolute 0.04 0.00 - 0.40 10*3/mm3    Basophils, Absolute 0.06 0.00 - 0.20 10*3/mm3    Immature Grans, Absolute 0.07 (H) 0.00 - 0.05 10*3/mm3    nRBC 0.0 0.0 - 0.2 /100 WBC   Basic Metabolic Panel    Collection Time: 12/24/24 10:46 PM    Specimen: Arm, Left; Blood   Result Value Ref Range    Glucose 113 (H) 65 - 99 mg/dL    BUN 9 8 - 23 mg/dL    Creatinine 0.61 0.57 - 1.00 mg/dL    Sodium 130 (L) 136 - 145 mmol/L    Potassium 4.1 3.5 - 5.2 mmol/L    Chloride 93 (L) 98 - 107 mmol/L    CO2 23.4 22.0 - 29.0 mmol/L    Calcium 9.4 8.6 - 10.5 mg/dL    BUN/Creatinine Ratio 14.8 7.0 - 25.0    Anion Gap 13.6 5.0 - 15.0 mmol/L    eGFR 89.9 >60.0 mL/min/1.73   ECG 12 Lead Pre-Op / Pre-Procedure    Collection Time: 12/24/24 11:04 PM   Result Value Ref Range    QT Interval 418 ms    QTC Interval 456 ms   Urinalysis With Microscopic If Indicated (No Culture) - Straight Cath    Collection Time: 12/24/24 11:31 PM    Specimen: Straight Cath; Urine   Result Value Ref Range    Color, UA Yellow Yellow, Straw    Appearance, UA Hazy (A) Clear    pH, UA 8.0 5.0 - 8.0    Specific Gravity, UA 1.011 1.005 - 1.030    Glucose, UA Negative Negative    Ketones, UA 15 mg/dL (1+) (A) Negative    Bilirubin, UA Negative Negative    Blood, UA Negative Negative    Protein, UA Negative Negative    Leuk Esterase, UA Negative Negative    Nitrite, UA Negative Negative    Urobilinogen, UA 0.2 E.U./dL 0.2 - 1.0 E.U./dL   Basic Metabolic Panel    Collection Time: 12/25/24   5:45 AM    Specimen: Blood   Result Value Ref Range    Glucose 131 (H) 65 - 99 mg/dL    BUN 8 8 - 23 mg/dL    Creatinine 0.65 0.57 - 1.00 mg/dL    Sodium 130 (L) 136 - 145 mmol/L    Potassium 4.2 3.5 - 5.2 mmol/L    Chloride 95 (L) 98 - 107 mmol/L    CO2 25.9 22.0 - 29.0 mmol/L    Calcium 9.2 8.6 - 10.5 mg/dL    BUN/Creatinine Ratio 12.3 7.0 - 25.0    Anion Gap 9.1 5.0 - 15.0 mmol/L    eGFR 88.6 >60.0 mL/min/1.73   CBC Auto Differential    Collection Time: 12/25/24  5:45 AM    Specimen: Blood   Result Value Ref Range    WBC 13.19 (H) 3.40 - 10.80 10*3/mm3    RBC 4.43 3.77 - 5.28 10*6/mm3    Hemoglobin 13.6 12.0 - 15.9 g/dL    Hematocrit 40.6 34.0 - 46.6 %    MCV 91.6 79.0 - 97.0 fL    MCH 30.7 26.6 - 33.0 pg    MCHC 33.5 31.5 - 35.7 g/dL    RDW 12.4 12.3 - 15.4 %    RDW-SD 41.6 37.0 - 54.0 fl    MPV 10.1 6.0 - 12.0 fL    Platelets 264 140 - 450 10*3/mm3    Neutrophil % 92.1 (H) 42.7 - 76.0 %    Lymphocyte % 4.9 (L) 19.6 - 45.3 %    Monocyte % 2.4 (L) 5.0 - 12.0 %    Eosinophil % 0.0 (L) 0.3 - 6.2 %    Basophil % 0.3 0.0 - 1.5 %    Immature Grans % 0.3 0.0 - 0.5 %    Neutrophils, Absolute 12.15 (H) 1.70 - 7.00 10*3/mm3    Lymphocytes, Absolute 0.65 (L) 0.70 - 3.10 10*3/mm3    Monocytes, Absolute 0.31 0.10 - 0.90 10*3/mm3    Eosinophils, Absolute 0.00 0.00 - 0.40 10*3/mm3    Basophils, Absolute 0.04 0.00 - 0.20 10*3/mm3    Immature Grans, Absolute 0.04 0.00 - 0.05 10*3/mm3    nRBC 0.0 0.0 - 0.2 /100 WBC       IMPRESSION:  Patient is a 79 y.o. Not  or  female with left Femoral Neck Fracture    PLAN:   Admited to: Ryne Velasquez MD  Diet: NPO after midnight, Regular for Now  Weight Bearing:Left Lower Extremity Non Weight Bearing until after surgery  Labs: None additional needed  Imaging: None additional needed  Surgery: Left hip arthroplasty for femoral neck fracture  Risk benefits and alternatives thoroughly discussed with the patient and she does understand the risks of hip arthroplasty  "surgery  Disposition: Unfortunately, this very increased risk it is emergencies only for the operating room and her hip surgery will not qualify.  She may have a regular diet today and be n.p.o. after midnight for surgery tomorrow either with myself or with my partner Dr. Hernandez.  Exact timing of that surgery tomorrow still to be determined.    R \"Maykel\" Ann SILVA MD  Orthopaedic Surgery  [facility]  [phone] - [place] [facility]  [phone] - [place] [facility]          "

## 2024-12-26 ENCOUNTER — ANESTHESIA EVENT (OUTPATIENT)
Dept: PERIOP | Facility: HOSPITAL | Age: 79
End: 2024-12-26
Payer: MEDICARE

## 2024-12-26 ENCOUNTER — APPOINTMENT (OUTPATIENT)
Dept: GENERAL RADIOLOGY | Facility: HOSPITAL | Age: 79
End: 2024-12-26
Payer: MEDICARE

## 2024-12-26 ENCOUNTER — ANESTHESIA (OUTPATIENT)
Dept: PERIOP | Facility: HOSPITAL | Age: 79
End: 2024-12-26
Payer: MEDICARE

## 2024-12-26 LAB
ABO GROUP BLD: NORMAL
ANION GAP SERPL CALCULATED.3IONS-SCNC: 7.3 MMOL/L (ref 5–15)
APTT PPP: 28.6 SECONDS (ref 22.7–35.4)
BASOPHILS # BLD AUTO: 0.05 10*3/MM3 (ref 0–0.2)
BASOPHILS # BLD AUTO: NORMAL 10*3/UL
BASOPHILS NFR BLD AUTO: 0.5 % (ref 0–1.5)
BASOPHILS NFR BLD AUTO: NORMAL %
BLD GP AB SCN SERPL QL: NEGATIVE
BUN SERPL-MCNC: 10 MG/DL (ref 8–23)
BUN/CREAT SERPL: 14.9 (ref 7–25)
CALCIUM SPEC-SCNC: 8.6 MG/DL (ref 8.6–10.5)
CHLORIDE SERPL-SCNC: 99 MMOL/L (ref 98–107)
CO2 SERPL-SCNC: 25.7 MMOL/L (ref 22–29)
CREAT SERPL-MCNC: 0.67 MG/DL (ref 0.57–1)
DEPRECATED RDW RBC AUTO: 44.5 FL (ref 37–54)
DEPRECATED RDW RBC AUTO: NORMAL FL
EGFRCR SERPLBLD CKD-EPI 2021: 87.9 ML/MIN/1.73
EOSINOPHIL # BLD AUTO: 0.15 10*3/MM3 (ref 0–0.4)
EOSINOPHIL # BLD AUTO: NORMAL 10*3/UL
EOSINOPHIL NFR BLD AUTO: 1.5 % (ref 0.3–6.2)
EOSINOPHIL NFR BLD AUTO: NORMAL %
ERYTHROCYTE [DISTWIDTH] IN BLOOD BY AUTOMATED COUNT: 12.9 % (ref 12.3–15.4)
ERYTHROCYTE [DISTWIDTH] IN BLOOD BY AUTOMATED COUNT: NORMAL %
GLUCOSE SERPL-MCNC: 100 MG/DL (ref 65–99)
HCT VFR BLD AUTO: 37.3 % (ref 34–46.6)
HCT VFR BLD AUTO: NORMAL %
HGB BLD-MCNC: 12.4 G/DL (ref 12–15.9)
HGB BLD-MCNC: NORMAL G/DL
IMM GRANULOCYTES # BLD AUTO: 0.04 10*3/MM3 (ref 0–0.05)
IMM GRANULOCYTES # BLD AUTO: NORMAL 10*3/UL
IMM GRANULOCYTES NFR BLD AUTO: 0.4 % (ref 0–0.5)
IMM GRANULOCYTES NFR BLD AUTO: NORMAL %
INR PPP: 1.14 (ref 0.9–1.1)
LYMPHOCYTES # BLD AUTO: 1.57 10*3/MM3 (ref 0.7–3.1)
LYMPHOCYTES # BLD AUTO: NORMAL 10*3/UL
LYMPHOCYTES NFR BLD AUTO: 15.8 % (ref 19.6–45.3)
LYMPHOCYTES NFR BLD AUTO: NORMAL %
MCH RBC QN AUTO: 31.2 PG (ref 26.6–33)
MCH RBC QN AUTO: NORMAL PG
MCHC RBC AUTO-ENTMCNC: 33.2 G/DL (ref 31.5–35.7)
MCHC RBC AUTO-ENTMCNC: NORMAL G/DL
MCV RBC AUTO: 94 FL (ref 79–97)
MCV RBC AUTO: NORMAL FL
MONOCYTES # BLD AUTO: 0.52 10*3/MM3 (ref 0.1–0.9)
MONOCYTES # BLD AUTO: NORMAL 10*3/UL
MONOCYTES NFR BLD AUTO: 5.2 % (ref 5–12)
MONOCYTES NFR BLD AUTO: NORMAL %
NEUTROPHILS NFR BLD AUTO: 7.59 10*3/MM3 (ref 1.7–7)
NEUTROPHILS NFR BLD AUTO: 76.6 % (ref 42.7–76)
NEUTROPHILS NFR BLD AUTO: NORMAL %
NEUTROPHILS NFR BLD AUTO: NORMAL %
NRBC BLD AUTO-RTO: 0 /100 WBC (ref 0–0.2)
NRBC BLD AUTO-RTO: NORMAL %
PLATELET # BLD AUTO: 209 10*3/MM3 (ref 140–450)
PLATELET # BLD AUTO: NORMAL 10*3/UL
PMV BLD AUTO: 10.3 FL (ref 6–12)
PMV BLD AUTO: NORMAL FL
POTASSIUM SERPL-SCNC: 4 MMOL/L (ref 3.5–5.2)
PROTHROMBIN TIME: 14.6 SECONDS (ref 11.7–14.2)
RBC # BLD AUTO: 3.97 10*6/MM3 (ref 3.77–5.28)
RBC # BLD AUTO: NORMAL 10*6/UL
RH BLD: POSITIVE
SODIUM SERPL-SCNC: 132 MMOL/L (ref 136–145)
T&S EXPIRATION DATE: NORMAL
WBC NRBC COR # BLD AUTO: 9.92 10*3/MM3 (ref 3.4–10.8)
WBC NRBC COR # BLD AUTO: NORMAL 10*3/UL

## 2024-12-26 PROCEDURE — 85025 COMPLETE CBC W/AUTO DIFF WBC: CPT | Performed by: NURSE PRACTITIONER

## 2024-12-26 PROCEDURE — 73501 X-RAY EXAM HIP UNI 1 VIEW: CPT

## 2024-12-26 PROCEDURE — 25010000002 CEFAZOLIN PER 500 MG: Performed by: ORTHOPAEDIC SURGERY

## 2024-12-26 PROCEDURE — 25010000002 PROPOFOL 200 MG/20ML EMULSION: Performed by: NURSE ANESTHETIST, CERTIFIED REGISTERED

## 2024-12-26 PROCEDURE — C1776 JOINT DEVICE (IMPLANTABLE): HCPCS | Performed by: ORTHOPAEDIC SURGERY

## 2024-12-26 PROCEDURE — 85730 THROMBOPLASTIN TIME PARTIAL: CPT | Performed by: ORTHOPAEDIC SURGERY

## 2024-12-26 PROCEDURE — 25010000002 FENTANYL CITRATE (PF) 50 MCG/ML SOLUTION: Performed by: NURSE ANESTHETIST, CERTIFIED REGISTERED

## 2024-12-26 PROCEDURE — 25010000002 ONDANSETRON PER 1 MG

## 2024-12-26 PROCEDURE — 80048 BASIC METABOLIC PNL TOTAL CA: CPT | Performed by: NURSE PRACTITIONER

## 2024-12-26 PROCEDURE — 25010000002 CLONIDINE PER 1 MG: Performed by: ORTHOPAEDIC SURGERY

## 2024-12-26 PROCEDURE — 85610 PROTHROMBIN TIME: CPT | Performed by: ORTHOPAEDIC SURGERY

## 2024-12-26 PROCEDURE — 86900 BLOOD TYPING SEROLOGIC ABO: CPT | Performed by: ORTHOPAEDIC SURGERY

## 2024-12-26 PROCEDURE — 25010000002 KETOROLAC TROMETHAMINE PER 15 MG: Performed by: ORTHOPAEDIC SURGERY

## 2024-12-26 PROCEDURE — 25010000002 VANCOMYCIN 1 G RECONSTITUTED SOLUTION: Performed by: ORTHOPAEDIC SURGERY

## 2024-12-26 PROCEDURE — 25010000002 PROPOFOL 10 MG/ML EMULSION: Performed by: NURSE ANESTHETIST, CERTIFIED REGISTERED

## 2024-12-26 PROCEDURE — 25810000003 LACTATED RINGERS PER 1000 ML: Performed by: NURSE ANESTHETIST, CERTIFIED REGISTERED

## 2024-12-26 PROCEDURE — 25010000002 HYDROMORPHONE PER 4 MG: Performed by: NURSE PRACTITIONER

## 2024-12-26 PROCEDURE — 25010000002 SUGAMMADEX 200 MG/2ML SOLUTION: Performed by: ANESTHESIOLOGY

## 2024-12-26 PROCEDURE — 76000 FLUOROSCOPY <1 HR PHYS/QHP: CPT

## 2024-12-26 PROCEDURE — 86900 BLOOD TYPING SEROLOGIC ABO: CPT

## 2024-12-26 PROCEDURE — 25010000002 FENTANYL CITRATE (PF) 100 MCG/2ML SOLUTION: Performed by: NURSE ANESTHETIST, CERTIFIED REGISTERED

## 2024-12-26 PROCEDURE — 86901 BLOOD TYPING SEROLOGIC RH(D): CPT

## 2024-12-26 PROCEDURE — 25010000002 HYDROMORPHONE 1 MG/ML SOLUTION

## 2024-12-26 PROCEDURE — 25010000002 ROPIVACAINE PER 1 MG: Performed by: ORTHOPAEDIC SURGERY

## 2024-12-26 PROCEDURE — 25010000002 LIDOCAINE PF 2% 2 % SOLUTION: Performed by: NURSE ANESTHETIST, CERTIFIED REGISTERED

## 2024-12-26 PROCEDURE — 86850 RBC ANTIBODY SCREEN: CPT | Performed by: ORTHOPAEDIC SURGERY

## 2024-12-26 PROCEDURE — 25010000002 DEXAMETHASONE PER 1 MG: Performed by: NURSE ANESTHETIST, CERTIFIED REGISTERED

## 2024-12-26 PROCEDURE — 25010000002 EPINEPHRINE 1 MG/ML SOLUTION: Performed by: ORTHOPAEDIC SURGERY

## 2024-12-26 PROCEDURE — 86901 BLOOD TYPING SEROLOGIC RH(D): CPT | Performed by: ORTHOPAEDIC SURGERY

## 2024-12-26 PROCEDURE — C1713 ANCHOR/SCREW BN/BN,TIS/BN: HCPCS | Performed by: ORTHOPAEDIC SURGERY

## 2024-12-26 DEVICE — CMT BONE PALACOS R HI/VISC 1X40: Type: IMPLANTABLE DEVICE | Site: HIP | Status: FUNCTIONAL

## 2024-12-26 DEVICE — EMPHASYS ACETABULAR SHELL THREE-HOLE 50MM CEMENTLESS
Type: IMPLANTABLE DEVICE | Site: HIP | Status: FUNCTIONAL
Brand: EMPHASYS

## 2024-12-26 DEVICE — SUMMIT FEMORAL STEM 12/14 TAPER CEMENTED SIZE 3 STD 108MM
Type: IMPLANTABLE DEVICE | Site: HIP | Status: FUNCTIONAL
Brand: SUMMIT

## 2024-12-26 DEVICE — DEV CONTRL TISS STRATAFIX SPIRAL MNCRYL UD 3/0 PLS 30CM: Type: IMPLANTABLE DEVICE | Site: HIP | Status: FUNCTIONAL

## 2024-12-26 DEVICE — TOTL HIP COP DEPUY 9641334: Type: IMPLANTABLE DEVICE | Status: FUNCTIONAL

## 2024-12-26 DEVICE — CP ACET HIP EMPHASYS UPCHRG: Type: IMPLANTABLE DEVICE | Status: FUNCTIONAL

## 2024-12-26 DEVICE — SUT FW #2 W/TPR NDL 1/2 CIR 38IN 97CM 26.5MM BLU: Type: IMPLANTABLE DEVICE | Site: HIP | Status: FUNCTIONAL

## 2024-12-26 DEVICE — BONE PREPARATION KIT
Type: IMPLANTABLE DEVICE | Site: HIP | Status: FUNCTIONAL
Brand: BIOPREP

## 2024-12-26 DEVICE — EMPHASYS POLYETHYLENE LINER AOX NEUTRAL 50MM 36MM
Type: IMPLANTABLE DEVICE | Site: HIP | Status: FUNCTIONAL
Brand: EMPHASYS

## 2024-12-26 DEVICE — CEMENTRALIZER STEM CENTRALIZER 10.0MM CEMENTED
Type: IMPLANTABLE DEVICE | Site: HIP | Status: FUNCTIONAL
Brand: CEMENTRALIZER

## 2024-12-26 DEVICE — DEV WND/CLS CONTRL TISS STRATAFIX SYMM PDS PLS CTX 60CM VIL: Type: IMPLANTABLE DEVICE | Site: HIP | Status: FUNCTIONAL

## 2024-12-26 DEVICE — BIOLOX DELTA CERAMIC FEMORAL HEAD +1.5 36MM DIA 12/14 TAPER
Type: IMPLANTABLE DEVICE | Site: HIP | Status: FUNCTIONAL
Brand: BIOLOX DELTA

## 2024-12-26 RX ORDER — DEXAMETHASONE SODIUM PHOSPHATE 4 MG/ML
INJECTION, SOLUTION INTRA-ARTICULAR; INTRALESIONAL; INTRAMUSCULAR; INTRAVENOUS; SOFT TISSUE AS NEEDED
Status: DISCONTINUED | OUTPATIENT
Start: 2024-12-26 | End: 2024-12-26 | Stop reason: SURG

## 2024-12-26 RX ORDER — PROPOFOL 10 MG/ML
INJECTION, EMULSION INTRAVENOUS AS NEEDED
Status: DISCONTINUED | OUTPATIENT
Start: 2024-12-26 | End: 2024-12-26 | Stop reason: SURG

## 2024-12-26 RX ORDER — MAGNESIUM HYDROXIDE 1200 MG/15ML
LIQUID ORAL AS NEEDED
Status: DISCONTINUED | OUTPATIENT
Start: 2024-12-26 | End: 2024-12-26 | Stop reason: HOSPADM

## 2024-12-26 RX ORDER — NALOXONE HCL 0.4 MG/ML
0.2 VIAL (ML) INJECTION AS NEEDED
Status: DISCONTINUED | OUTPATIENT
Start: 2024-12-26 | End: 2024-12-31 | Stop reason: HOSPADM

## 2024-12-26 RX ORDER — FENTANYL CITRATE 50 UG/ML
INJECTION, SOLUTION INTRAMUSCULAR; INTRAVENOUS AS NEEDED
Status: DISCONTINUED | OUTPATIENT
Start: 2024-12-26 | End: 2024-12-26 | Stop reason: SURG

## 2024-12-26 RX ORDER — EPHEDRINE SULFATE 5 MG/ML
INJECTION INTRAVENOUS AS NEEDED
Status: DISCONTINUED | OUTPATIENT
Start: 2024-12-26 | End: 2024-12-26 | Stop reason: SURG

## 2024-12-26 RX ORDER — ALBUTEROL SULFATE 90 UG/1
INHALANT RESPIRATORY (INHALATION) AS NEEDED
Status: DISCONTINUED | OUTPATIENT
Start: 2024-12-26 | End: 2024-12-26 | Stop reason: SURG

## 2024-12-26 RX ORDER — ONDANSETRON 2 MG/ML
4 INJECTION INTRAMUSCULAR; INTRAVENOUS ONCE AS NEEDED
Status: DISCONTINUED | OUTPATIENT
Start: 2024-12-26 | End: 2024-12-31 | Stop reason: HOSPADM

## 2024-12-26 RX ORDER — DEXMEDETOMIDINE HYDROCHLORIDE 100 UG/ML
INJECTION, SOLUTION INTRAVENOUS AS NEEDED
Status: DISCONTINUED | OUTPATIENT
Start: 2024-12-26 | End: 2024-12-26 | Stop reason: SURG

## 2024-12-26 RX ORDER — VANCOMYCIN HYDROCHLORIDE 1 G/20ML
INJECTION, POWDER, LYOPHILIZED, FOR SOLUTION INTRAVENOUS AS NEEDED
Status: DISCONTINUED | OUTPATIENT
Start: 2024-12-26 | End: 2024-12-26 | Stop reason: HOSPADM

## 2024-12-26 RX ORDER — ATROPINE SULFATE 0.4 MG/ML
0.4 INJECTION, SOLUTION INTRAMUSCULAR; INTRAVENOUS; SUBCUTANEOUS ONCE AS NEEDED
Status: DISCONTINUED | OUTPATIENT
Start: 2024-12-26 | End: 2024-12-31 | Stop reason: HOSPADM

## 2024-12-26 RX ORDER — ROCURONIUM BROMIDE 10 MG/ML
INJECTION, SOLUTION INTRAVENOUS AS NEEDED
Status: DISCONTINUED | OUTPATIENT
Start: 2024-12-26 | End: 2024-12-26 | Stop reason: SURG

## 2024-12-26 RX ORDER — FENTANYL CITRATE 50 UG/ML
25 INJECTION, SOLUTION INTRAMUSCULAR; INTRAVENOUS
Status: DISCONTINUED | OUTPATIENT
Start: 2024-12-26 | End: 2024-12-31 | Stop reason: HOSPADM

## 2024-12-26 RX ORDER — SODIUM CHLORIDE, SODIUM LACTATE, POTASSIUM CHLORIDE, CALCIUM CHLORIDE 600; 310; 30; 20 MG/100ML; MG/100ML; MG/100ML; MG/100ML
INJECTION, SOLUTION INTRAVENOUS CONTINUOUS PRN
Status: DISCONTINUED | OUTPATIENT
Start: 2024-12-26 | End: 2024-12-26 | Stop reason: SURG

## 2024-12-26 RX ORDER — DIPHENHYDRAMINE HYDROCHLORIDE 50 MG/ML
12.5 INJECTION INTRAMUSCULAR; INTRAVENOUS
Status: DISCONTINUED | OUTPATIENT
Start: 2024-12-26 | End: 2024-12-31 | Stop reason: HOSPADM

## 2024-12-26 RX ORDER — PROCHLORPERAZINE EDISYLATE 5 MG/ML
10 INJECTION INTRAMUSCULAR; INTRAVENOUS EVERY 6 HOURS PRN
Status: DISCONTINUED | OUTPATIENT
Start: 2024-12-26 | End: 2024-12-31 | Stop reason: HOSPADM

## 2024-12-26 RX ORDER — PROMETHAZINE HYDROCHLORIDE 25 MG/1
25 TABLET ORAL ONCE AS NEEDED
Status: DISCONTINUED | OUTPATIENT
Start: 2024-12-26 | End: 2024-12-31 | Stop reason: HOSPADM

## 2024-12-26 RX ORDER — FLUMAZENIL 0.1 MG/ML
0.2 INJECTION INTRAVENOUS AS NEEDED
Status: DISCONTINUED | OUTPATIENT
Start: 2024-12-26 | End: 2024-12-31 | Stop reason: HOSPADM

## 2024-12-26 RX ORDER — TRANEXAMIC ACID 10 MG/ML
1000 INJECTION, SOLUTION INTRAVENOUS ONCE
Status: COMPLETED | OUTPATIENT
Start: 2024-12-26 | End: 2024-12-26

## 2024-12-26 RX ORDER — PROMETHAZINE HYDROCHLORIDE 25 MG/1
25 SUPPOSITORY RECTAL ONCE AS NEEDED
Status: DISCONTINUED | OUTPATIENT
Start: 2024-12-26 | End: 2024-12-31 | Stop reason: HOSPADM

## 2024-12-26 RX ORDER — SUCCINYLCHOLINE CHLORIDE 20 MG/ML
INJECTION INTRAMUSCULAR; INTRAVENOUS AS NEEDED
Status: DISCONTINUED | OUTPATIENT
Start: 2024-12-26 | End: 2024-12-26

## 2024-12-26 RX ORDER — ONDANSETRON 2 MG/ML
INJECTION INTRAMUSCULAR; INTRAVENOUS AS NEEDED
Status: DISCONTINUED | OUTPATIENT
Start: 2024-12-26 | End: 2024-12-26 | Stop reason: SURG

## 2024-12-26 RX ORDER — IPRATROPIUM BROMIDE AND ALBUTEROL SULFATE 2.5; .5 MG/3ML; MG/3ML
3 SOLUTION RESPIRATORY (INHALATION) ONCE AS NEEDED
Status: DISCONTINUED | OUTPATIENT
Start: 2024-12-26 | End: 2024-12-31 | Stop reason: HOSPADM

## 2024-12-26 RX ORDER — TRANEXAMIC ACID 10 MG/ML
1000 INJECTION, SOLUTION INTRAVENOUS ONCE
Status: DISCONTINUED | OUTPATIENT
Start: 2024-12-26 | End: 2024-12-26 | Stop reason: HOSPADM

## 2024-12-26 RX ADMIN — HYDROMORPHONE HYDROCHLORIDE 0.25 MG: 1 INJECTION, SOLUTION INTRAMUSCULAR; INTRAVENOUS; SUBCUTANEOUS at 18:42

## 2024-12-26 RX ADMIN — PROPOFOL INJECTABLE EMULSION 120 MCG/KG/MIN: 10 INJECTION, EMULSION INTRAVENOUS at 17:48

## 2024-12-26 RX ADMIN — PROPOFOL 100 MG: 10 INJECTION, EMULSION INTRAVENOUS at 17:45

## 2024-12-26 RX ADMIN — HYDROMORPHONE HYDROCHLORIDE 0.5 MG: 1 INJECTION, SOLUTION INTRAMUSCULAR; INTRAVENOUS; SUBCUTANEOUS at 09:41

## 2024-12-26 RX ADMIN — ONDANSETRON 4 MG: 2 INJECTION, SOLUTION INTRAMUSCULAR; INTRAVENOUS at 19:21

## 2024-12-26 RX ADMIN — ROCURONIUM BROMIDE 50 MG: 10 INJECTION, SOLUTION INTRAVENOUS at 17:45

## 2024-12-26 RX ADMIN — CEFAZOLIN 2000 MG: 2 INJECTION, POWDER, FOR SOLUTION INTRAMUSCULAR; INTRAVENOUS at 17:38

## 2024-12-26 RX ADMIN — EPHEDRINE SULFATE 10 MG: 5 INJECTION INTRAVENOUS at 18:46

## 2024-12-26 RX ADMIN — FENTANYL CITRATE 50 MCG: 50 INJECTION, SOLUTION INTRAMUSCULAR; INTRAVENOUS at 20:12

## 2024-12-26 RX ADMIN — DEXAMETHASONE SODIUM PHOSPHATE 4 MG: 4 INJECTION, SOLUTION INTRAMUSCULAR; INTRAVENOUS at 17:56

## 2024-12-26 RX ADMIN — SODIUM CHLORIDE, SODIUM LACTATE, POTASSIUM CHLORIDE, AND CALCIUM CHLORIDE: .6; .31; .03; .02 INJECTION, SOLUTION INTRAVENOUS at 17:41

## 2024-12-26 RX ADMIN — LIDOCAINE HYDROCHLORIDE 50 MG: 20 INJECTION, SOLUTION EPIDURAL; INFILTRATION; INTRACAUDAL; PERINEURAL at 17:45

## 2024-12-26 RX ADMIN — TRANEXAMIC ACID 1000 MG: 10 INJECTION, SOLUTION INTRAVENOUS at 17:56

## 2024-12-26 RX ADMIN — TRANEXAMIC ACID 1000 MG: 10 INJECTION, SOLUTION INTRAVENOUS at 19:18

## 2024-12-26 RX ADMIN — FENTANYL CITRATE 50 MCG: 50 INJECTION, SOLUTION INTRAMUSCULAR; INTRAVENOUS at 18:02

## 2024-12-26 RX ADMIN — DEXMEDETOMIDINE HYDROCHLORIDE 2 MCG: 100 INJECTION, SOLUTION INTRAVENOUS at 18:18

## 2024-12-26 RX ADMIN — DEXMEDETOMIDINE HYDROCHLORIDE 2 MCG: 100 INJECTION, SOLUTION INTRAVENOUS at 18:12

## 2024-12-26 RX ADMIN — HYDROMORPHONE HYDROCHLORIDE 0.25 MG: 1 INJECTION, SOLUTION INTRAMUSCULAR; INTRAVENOUS; SUBCUTANEOUS at 19:21

## 2024-12-26 RX ADMIN — FENTANYL CITRATE 50 MCG: 50 INJECTION, SOLUTION INTRAMUSCULAR; INTRAVENOUS at 17:45

## 2024-12-26 RX ADMIN — ONDANSETRON 4 MG: 2 INJECTION, SOLUTION INTRAMUSCULAR; INTRAVENOUS at 18:43

## 2024-12-26 RX ADMIN — ALBUTEROL SULFATE 10 PUFF: 108 AEROSOL, METERED RESPIRATORY (INHALATION) at 18:50

## 2024-12-26 RX ADMIN — HYDROMORPHONE HYDROCHLORIDE 0.5 MG: 1 INJECTION, SOLUTION INTRAMUSCULAR; INTRAVENOUS; SUBCUTANEOUS at 19:33

## 2024-12-26 RX ADMIN — DEXAMETHASONE SODIUM PHOSPHATE 4 MG: 4 INJECTION, SOLUTION INTRAMUSCULAR; INTRAVENOUS at 19:21

## 2024-12-26 RX ADMIN — SUGAMMADEX 200 MG: 100 INJECTION, SOLUTION INTRAVENOUS at 19:37

## 2024-12-26 NOTE — PLAN OF CARE
Goal Outcome Evaluation:                      Pt disoriented to time and situation. Pain treated per MAR. Pt having surgery today on L hip. Fall precautions in place. Plan of care ongoing

## 2024-12-26 NOTE — CASE MANAGEMENT/SOCIAL WORK
Discharge Planning Assessment   [facility]     Patient Name: Dorie Moctezuma  MRN: [id number]  Today's Date: 12/26/2024    Admit Date: 12/24/2024    Plan: DC PLAN: From routine home. PT/OT alison pending.     Discharge Needs Assessment       Row Name 12/26/24 1154       Living Environment    People in Home alone    Current Living Arrangements home    Potentially Unsafe Housing Conditions none    In the past 12 months has the electric, gas, oil, or water company threatened to shut off services in your home? No    Primary Care Provided by self    Provides Primary Care For no one    Quality of Family Relationships helpful;involved;supportive    Able to Return to Prior Arrangements yes       Resource/Environmental Concerns    Resource/Environmental Concerns none    Transportation Concerns none       Transportation Needs    In the past 12 months, has lack of transportation kept you from medical appointments or from getting medications? no    In the past 12 months, has lack of transportation kept you from meetings, work, or from getting things needed for daily living? No       Food Insecurity    Within the past 12 months, you worried that your food would run out before you got the money to buy more. Never true    Within the past 12 months, the food you bought just didn't last and you didn't have money to get more. Never true       Transition Planning    Patient/Family Anticipates Transition to home    Patient/Family Anticipated Services at Transition none    Transportation Anticipated car, drives self;family or friend will provide       Discharge Needs Assessment    Readmission Within the Last 30 Days no previous admission in last 30 days    Equipment Currently Used at Home none    Anticipated Changes Related to Illness none    Equipment Needed After Discharge none                   Discharge Plan       Row Name 12/26/24 1158       Plan    Plan DC PLAN: From routine home. PT/OT alison pending.    Patient/Family in Agreement  with Plan yes    Plan Comments CM met with patient at bedside, from routine home alone. Prior to this admission, Independent with ADL's no DME. PCP is [facility], Pharmacy is [facility]. Able to afford medications, denies any issues with food or utilities. Denies any transportation issues, Son will provide transport. Denies any HHC or SNF needs, but agreeable to work with PT/OT needs. Pending evaluation. Pending Surgery, PT/OT will evaluate post op.                  Continued Care and Services - Admitted Since 12/24/2024    No active coordination exists for this encounter.       Expected Discharge Date and Time       Expected Discharge Date Expected Discharge Time    Dec 27, 2024            Demographic Summary       Row Name 12/26/24 1152       General Information    Admission Type inpatient    Arrived From emergency department    Required Notices Provided Important Message from Medicare    Referral Source admission list    Reason for Consult discharge planning    Preferred Language English                   Functional Status       Row Name 12/26/24 1154       Functional Status    Usual Activity Tolerance excellent    Current Activity Tolerance excellent       Physical Activity    On average, how many days per week do you engage in moderate to strenuous exercise (like a brisk walk)? 0 days    On average, how many minutes do you engage in exercise at this level? 0 min    Number of minutes of exercise per week 0       Assessment of Health Literacy    How often do you have someone help you read hospital materials? Sometimes    How often do you have problems learning about your medical condition because of difficulty understanding written information? Sometimes    How often do you have a problem understanding what is told to you about your medical condition? Sometimes    How confident are you filling out medical forms by yourself? Somewhat    Health Literacy Moderate       Functional Status, IADL    Medications independent     Meal Preparation independent    Housekeeping independent    Laundry independent    Shopping independent       Mental Status    General Appearance WDL WDL       Mental Status Summary    Recent Changes in Mental Status/Cognitive Functioning no changes                       Patient Forms       Row Name 12/26/24 1148       Patient Forms    Important Message from Medicare ([facility]) Delivered  IMM 12/24/24 per registration    Delivered to Patient    Method of delivery In person                  Charmaine Royal RN  Case Management  [phone]

## 2024-12-26 NOTE — PROGRESS NOTES
[facility] MEDICINE SERVICE  DAILY PROGRESS NOTE    NAME: Dorie Moctezuma  : 1945  MRN: [id number]      LOS: 2 days     PROVIDER OF SERVICE: Tab Mandel MD    Chief Complaint: Fracture of femoral neck, left    Subjective:     Interval History:  History taken from: patient    History of Present Illness: Dorie Moctezuma is a 79 y.o. female with a CMH of hyperlipidemia, hypertension, anxiety and depression, hypothyroidism osteoporosis who presented to [facility] on 2024 with left hip pain after mechanical fall at home.  Reports family dog jumped on her and caused her to fall onto her left hip.  She denies hitting her head or loss of consciousness she denies any other injury or precipitating factor for the fall.  She is awake and alert and answering appropriately.  Family at bedside.  X-ray hip and pelvis per radiology shows left femoral neck fracture.  Chest x-ray was negative per radiology, EKG shows normal sinus rhythm heart rate 71, labs show sodium of 130 chloride 93 glucose 113 WBC 14.11 and afebrile, urinalysis ordered and pending.  Orthopedics was consulted from the emergency department and advised she may not have surgery until Thursday given the holiday.     2024 patient seen and examined in bed no acute distress, vital signs stable, pain well-controlled, discussed with RN.        Doing overall well this morning, if she does not move she does not have pain, she is n.p.o. in preparation for surgery later on today       Review of Systems  Constitutional: Negative.    HENT: Negative.     Eyes: Negative.    Respiratory: Negative.     Cardiovascular: Negative.    Gastrointestinal: Negative.    Endocrine: Negative.    Genitourinary: Negative.    Musculoskeletal:  Positive for arthralgias.   Skin: Negative.    Allergic/Immunologic: Negative.    Neurological: Negative.    Hematological: Negative.    Psychiatric/Behavioral: Negative.     All other systems reviewed and are  negative.  Objective:     Vital Signs  Temp:  [97.6 °F (36.4 °C)-98.5 °F (36.9 °C)] 97.6 °F (36.4 °C)  Heart Rate:  [72-76] 76  Resp:  [13-21] 13  BP: (117-149)/(55-75) 149/68  Flow (L/min) (Oxygen Therapy):  [2] 2   Body mass index is 23.08 kg/m².      Physical Exam       Appearance: Normal appearance. She is normal weight.   HENT:      Head: Normocephalic and atraumatic.      Right Ear: External ear normal.      Left Ear: External ear normal.      Nose: Nose normal.      Mouth/Throat:      Mouth: Mucous membranes are moist.   Eyes:      Extraocular Movements: Extraocular movements intact.   Cardiovascular:      Rate and Rhythm: Normal rate and regular rhythm.      Pulses: Normal pulses.      Heart sounds: Normal heart sounds.   Pulmonary:      Effort: Pulmonary effort is normal.      Breath sounds: Normal breath sounds.   Abdominal:      Palpations: Abdomen is soft.   Genitourinary:     Comments: deferred  Musculoskeletal:      Cervical back: Normal range of motion and neck supple.      Comments: ROM LLE limited due to fracture    Skin:     General: Skin is warm and dry.   Neurological:      General: No focal deficit present.      Mental Status: She is alert and oriented to person, place, and time.   Psychiatric:         Mood and Affect: Mood normal.         Behavior: Behavior normal.         Thought Content: Thought content normal.         Judgment: Judgment normal.            Diagnostic Data    Results from last 7 days   Lab Units 12/26/24  0005   WBC 10*3/mm3 9.92   HEMOGLOBIN g/dL 12.4   HEMATOCRIT % 37.3   PLATELETS 10*3/mm3 209   GLUCOSE mg/dL 100*   CREATININE mg/dL 0.67   BUN mg/dL 10   SODIUM mmol/L 132*   POTASSIUM mmol/L 4.0   ANION GAP mmol/L 7.3       XR Chest 1 View    Result Date: 12/24/2024  No acute cardiopulmonary findings. Electronically Signed: Justo Talbert MD  12/24/2024 10:58 PM EST  Workstation ID: [id number]    XR Hip With or Without Pelvis 2 - 3 View Left    Result Date:  12/24/2024  Impression: Left femoral neck fracture Electronically Signed: Adarsh Burgos  12/24/2024 10:28 PM EST  Workstation ID: [id number]       I reviewed the patient's new clinical results.    Assessment/Plan:     Active and Resolved Problems  Active Hospital Problems    Diagnosis  POA    **Fracture of femoral neck, left [S72.002A]  Yes    Hyponatremia [E87.1]  Yes    Leukocytosis [D72.829]  Yes    Generalized anxiety disorder [F41.1]  Yes    Essential hypertension [I10]  Yes    Mixed hyperlipidemia [E78.2]  Yes    Acquired hypothyroidism [E03.9]  Yes      Resolved Hospital Problems   No resolved problems to display.       Left femoral neck status post mechanical fall, orthopedics consulted, 0.5 mg Dilaudid every 4 hours as needed  P.o. in preparation for surgery later on today  PT OT evaluation from tomorrow     Leukocytosis, WBC 14 afebrile chest x-ray and urinalysis negative , it was reactive, by this morning her white blood count is back to normal     Generalized anxiety disorder, no home meds     Essential hypertension, reordered home metoprolol      Acquired hypothyroidism, reordered home levothyroxine    VTE Prophylaxis:  No VTE prophylaxis order currently exists.    Disposition Planning:   Barriers to Discharge: Femoral neck fracture  Anticipated Date of Discharge: 12/28  Place of Discharge: NH      Time: 30 minutes     Code Status and Medical Interventions: CPR (Attempt to Resuscitate); Full Support   Ordered at: 12/24/24 2328     Code Status (Patient has no pulse and is not breathing):    CPR (Attempt to Resuscitate)     Medical Interventions (Patient has pulse or is breathing):    Full Support       Signature: Electronically signed by Tab Mandel MD, 12/26/24, 09:51 EST.  [facility]ist Team

## 2024-12-26 NOTE — ANESTHESIA PROCEDURE NOTES
Airway  Urgency: elective    Date/Time: 12/26/2024 5:46 PM  Airway not difficult    General Information and Staff    Patient location during procedure: OR  CRNA/CAA: Manfred Curran CRNA    Indications and Patient Condition  Indications for airway management: airway protection    Preoxygenated: yes  MILS maintained throughout  Mask difficulty assessment: 1 - vent by mask    Final Airway Details  Final airway type: endotracheal airway      Successful airway: ETT  Cuffed: yes   Successful intubation technique: direct laryngoscopy and video laryngoscopy  Blade: Maximo  Blade size: 3  ETT size (mm): 7.0  Cormack-Lehane Classification: grade I - full view of glottis  Placement verified by: chest auscultation   Measured from: gums  ETT/EBT to gums (cm): 22  Number of attempts at approach: 1  Assessment: lips, teeth, and gum same as pre-op and atraumatic intubation

## 2024-12-26 NOTE — PLAN OF CARE
Goal Outcome Evaluation:   Pain controlled with IV pain meds. Turning pt Q2. Pre op labs and CHG bath complete. Awaiting order to obtain informed consent for surgery. A/Ox2, pt is easily re orientated. Call light within reach, plan of care on going.

## 2024-12-26 NOTE — ANESTHESIA PREPROCEDURE EVALUATION
Anesthesia Evaluation     NPO Solid Status: > 8 hours  NPO Liquid Status: > 8 hours           Airway   Mallampati: II  TM distance: >3 FB  Neck ROM: full  No difficulty expected  Dental - normal exam     Pulmonary - normal exam   Cardiovascular - normal exam    (+) hypertension well controlled, hyperlipidemia      Neuro/Psych  (+) dizziness/light headedness, psychiatric history Anxiety and Depression  GI/Hepatic/Renal/Endo    (+) hepatitis C, liver disease, thyroid problem hypothyroidism and hyperthyroidism    Musculoskeletal     Abdominal  - normal exam    Bowel sounds: normal.   Substance History      OB/GYN          Other                      Anesthesia Plan    ASA 3     general   total IV anesthesia  intravenous induction     Anesthetic plan, risks, benefits, and alternatives have been provided, discussed and informed consent has been obtained with: patient.  Pre-procedure education provided  Plan discussed with CRNA.    CODE STATUS:    Code Status (Patient has no pulse and is not breathing): CPR (Attempt to Resuscitate)  Medical Interventions (Patient has pulse or is breathing): Full Support

## 2024-12-26 NOTE — PROGRESS NOTES
Orthopaedic Progress Note     Pain well controlled. Assuming care from my partner Dr. Maykel Juarez see his consult note for full details. In brief:  Patient is a 79 y.o. Not  or  female who presents with hip pain after a fall from standing.  They presented to the ER for further workup where a left Femoral Neck Fracture was found. Pain improved with pain medication and immobilization. Pain worsened with weight bearing. Patient unable to bear weight at left lower extremity.     Hgb: 12.4       NAD  Alert  Respirations are nonlabored   LLE   Skin intact   Leg resting in position of comfort   Able to DF and PF at the ankle   Sensation is intact distally   Foot is wwp   Compartments are soft and compressible     79 year old female with left femoral neck fracture   POD 1   -NWB LLE  -to chair if able post op   -pain control   -ice   -PT post op   The risks, benefits, and alternatives of hip fracture arthroplasty surgery were discussed extensively. The risks include but are not limited to infection, DVT, PE, bleeding and blood loss, damage to nerves or blood vessels, gallito-implant fracture, continued pain, hip stiffness/loss of motion, implant irritation,  infection and possibility of multiple surgeries, leg length discrepancy, and dislocation.The risks of anesthesia including heart attack, stroke, and even death were discussed. The typical post-operative course and rehab plan were discussed as well. We discussed the high morbidity mortality of hip fractures even after arthroplasty or fixation, and that sometimes we do this for pain control reasons alone. All the patient/family's questions were answered. No guarantees given. A consent form will be reviewed and signed in pre op and placed on the chart. The patient will receive perioperative antibiotics. The patient will be placed on DVT prophylaxis after surgery     -please call/chat  with any questions or concerns.    Alex Hernandez MD   [facility]   [phone] - [place] Office  [phone] - [facility]

## 2024-12-27 LAB
BASOPHILS # BLD AUTO: 0.02 10*3/MM3 (ref 0–0.2)
BASOPHILS NFR BLD AUTO: 0.2 % (ref 0–1.5)
DEPRECATED RDW RBC AUTO: 42 FL (ref 37–54)
EOSINOPHIL # BLD AUTO: 0 10*3/MM3 (ref 0–0.4)
EOSINOPHIL NFR BLD AUTO: 0 % (ref 0.3–6.2)
ERYTHROCYTE [DISTWIDTH] IN BLOOD BY AUTOMATED COUNT: 12.4 % (ref 12.3–15.4)
HCT VFR BLD AUTO: 36.4 % (ref 34–46.6)
HGB BLD-MCNC: 12 G/DL (ref 12–15.9)
IMM GRANULOCYTES # BLD AUTO: 0.04 10*3/MM3 (ref 0–0.05)
IMM GRANULOCYTES NFR BLD AUTO: 0.4 % (ref 0–0.5)
LYMPHOCYTES # BLD AUTO: 0.44 10*3/MM3 (ref 0.7–3.1)
LYMPHOCYTES NFR BLD AUTO: 4.3 % (ref 19.6–45.3)
MCH RBC QN AUTO: 30.3 PG (ref 26.6–33)
MCHC RBC AUTO-ENTMCNC: 33 G/DL (ref 31.5–35.7)
MCV RBC AUTO: 91.9 FL (ref 79–97)
MONOCYTES # BLD AUTO: 0.35 10*3/MM3 (ref 0.1–0.9)
MONOCYTES NFR BLD AUTO: 3.5 % (ref 5–12)
NEUTROPHILS NFR BLD AUTO: 9.29 10*3/MM3 (ref 1.7–7)
NEUTROPHILS NFR BLD AUTO: 91.6 % (ref 42.7–76)
NRBC BLD AUTO-RTO: 0 /100 WBC (ref 0–0.2)
PLATELET # BLD AUTO: 208 10*3/MM3 (ref 140–450)
PMV BLD AUTO: 10.4 FL (ref 6–12)
RBC # BLD AUTO: 3.96 10*6/MM3 (ref 3.77–5.28)
WBC NRBC COR # BLD AUTO: 10.14 10*3/MM3 (ref 3.4–10.8)

## 2024-12-27 PROCEDURE — 25010000002 CEFAZOLIN PER 500 MG: Performed by: ORTHOPAEDIC SURGERY

## 2024-12-27 PROCEDURE — 97162 PT EVAL MOD COMPLEX 30 MIN: CPT

## 2024-12-27 PROCEDURE — 97165 OT EVAL LOW COMPLEX 30 MIN: CPT

## 2024-12-27 PROCEDURE — 85025 COMPLETE CBC W/AUTO DIFF WBC: CPT | Performed by: ORTHOPAEDIC SURGERY

## 2024-12-27 RX ORDER — DOCUSATE SODIUM 100 MG/1
200 CAPSULE, LIQUID FILLED ORAL ONCE
Status: COMPLETED | OUTPATIENT
Start: 2024-12-27 | End: 2024-12-27

## 2024-12-27 RX ORDER — POLYETHYLENE GLYCOL 3350 17 G/17G
17 POWDER, FOR SOLUTION ORAL ONCE
Status: COMPLETED | OUTPATIENT
Start: 2024-12-27 | End: 2024-12-27

## 2024-12-27 RX ORDER — BISACODYL 10 MG
10 SUPPOSITORY, RECTAL RECTAL DAILY
Status: DISCONTINUED | OUTPATIENT
Start: 2024-12-27 | End: 2024-12-31 | Stop reason: HOSPADM

## 2024-12-27 RX ORDER — HYDROCODONE BITARTRATE AND ACETAMINOPHEN 5; 325 MG/1; MG/1
1 TABLET ORAL EVERY 6 HOURS PRN
Status: DISCONTINUED | OUTPATIENT
Start: 2024-12-27 | End: 2024-12-31 | Stop reason: HOSPADM

## 2024-12-27 RX ADMIN — LEVOTHYROXINE SODIUM 50 MCG: 0.05 TABLET ORAL at 05:09

## 2024-12-27 RX ADMIN — METOPROLOL SUCCINATE 25 MG: 25 TABLET, EXTENDED RELEASE ORAL at 08:32

## 2024-12-27 RX ADMIN — CEFAZOLIN 2000 MG: 2 INJECTION, POWDER, FOR SOLUTION INTRAMUSCULAR; INTRAVENOUS at 02:00

## 2024-12-27 RX ADMIN — POLYETHYLENE GLYCOL 3350 17 G: 17 POWDER, FOR SOLUTION ORAL at 16:18

## 2024-12-27 RX ADMIN — CEFAZOLIN 2000 MG: 2 INJECTION, POWDER, FOR SOLUTION INTRAMUSCULAR; INTRAVENOUS at 18:04

## 2024-12-27 RX ADMIN — HYDROCODONE BITARTRATE AND ACETAMINOPHEN 1 TABLET: 5; 325 TABLET ORAL at 16:18

## 2024-12-27 RX ADMIN — DOCUSATE SODIUM 200 MG: 100 CAPSULE, LIQUID FILLED ORAL at 16:18

## 2024-12-27 RX ADMIN — CEFAZOLIN 2000 MG: 2 INJECTION, POWDER, FOR SOLUTION INTRAMUSCULAR; INTRAVENOUS at 11:21

## 2024-12-27 NOTE — PROGRESS NOTES
Orthopaedic Progress Note     Pain well controlled. Dressing clean and dry, worked with PT but confused.     NAD  Alert  Respirations are nonlabored   LLE   Dressing clean and dry  Able to DF and PF at the ankle   Sensation is intact distally   Foot is wwp   Compartments are soft and compressible     79 year old female with left femoral neck fracture   POD 1 left VASHTI   -WBAT LLE    -to chair if able  -pain control   -ice and elevation   -PT   -dvt ppx to baseline AC or to ASA 81 mg BID for 6 weeks   -please call/chat  with any questions or concerns.    Alex Hernandez MD   [facility]   [phone] - [place] Office  [phone] - [place] Office

## 2024-12-27 NOTE — DISCHARGE INSTRUCTIONS
Total Hip  Discharge Instructions  Alex Hernandez MD    [phone]    INCISION CARE  Wash your hands prior to dressing changes  SHANTELLE Wound VAC: Postoperatively you had a SHANTELLE Wound Vac placed on the incision. This was placed under sterile conditions in the operating room. It remains in place for 7 days postoperatively. After 7 days, the entire dressing must be removed, including all of the sticky adhesive. The dressing and battery pack provide gentle suction to the incision and provide several benefits over a traditional dressing:  It maintains the sterile environment of the OR and reduces the risk of infection  The suction removes unwanted buildup of blood/hematoma under the skin to reduce swelling  The suction also promotes fresh blood supply to the skin and soft tissue to speed up healing  The postoperative scar is reduced in size  Showering is permitted immediately after surgery, but the battery pack must be protected or removed during the shower.   After 7 days the SHANTELLE Wound Vac is removed. If there is no drainage, no dressing is required. If there is some scant drainage a dry bandage can be applied and changed daily until seen in the office or until the drainage stops.   No creams or ointments to the incisions until 4 weeks post op.  Do not touch or pick at the incision  Check incision every day and notify surgeon immediately if any of the following signs or symptoms are seen:  Increase in redness  Increase in swelling around the incision and of the entire extremity  Increase in pain  NEW drainage or oozing from the incision  Pulling apart of the edges of the incision  Increase in overall body temperature (greater than 100.4 degrees)    ACTIVITIES  Exercises:  Physical therapy will begin immediately while in the hospital. Patients going to a nursing home will get therapy as part of their care at the SNU/SNF facility. Patients going home may also have a therapist come to the house to help them  mobilize until they can safely get to an outpatient therapy facility.  Elevate the affected leg most of the day during the first week post operatively. Caution must be taken to avoid pillow placement directly under the heel of the leg, as this can cause pressure ulcers even with a soft pillow. All pillows and blankets should be placed underneath of the thigh and calf so that the heel is free-floating.  Use cold packs for 20-30 minutes approximately 5 times per day.  You should perform the daily stretching and strengthening exercises as taught by the therapist as often as possible. This can be done many times a day.  Full weight bearing is allowed after surgery. It will be sore/painful to put weight on the leg, but this will help the bone to heal and prevent complications such as pneumonia, bed sores and blood clots. Mobilization is vital to the recovery process.  Activities of Daily Living:  No tub baths, hot tubs, or swimming pools for 4 weeks.  May shower and let water run over the incision immediately after surgery. The battery pack of the SHANTELLE Wound Vac must be protected or removed while in the shower. After the SHANTELLE is removed 7 days after surgery showering is permitted as long as there is no drainage from the wound.     Restrictions  Weight: It is ok to allow full weight bearing after surgery. Weight on the leg actually quickens the recovery process. While it will be sore/painful to put weight on the leg, it is safe to do so. Hip replacement after hip fracture has a much slower recover process. It can take months to heal fully from a hip fracture and patients even make some slow benefits up to a year afterwards.   Driving: Many patients have questions about when it is safe to return to driving. The answer is that this is extremely variable. It depends on the extent of the surgery, as well as how quickly you heal. Certainly left leg surgeries make returning to driving easier while right leg surgeries require  more extensive rehabilitation before driving can be safe. Until you can press down on the brake hard, and are off of all narcotics, driving is not permitted. Your surgeon cannot “clear” you to return to driving, only you can make the decision when you feel it is safe.    Medications  Anticoagulants: After upper extremity surgery most patients do not require an anticoagulant unless you have another injury that will be keeping you from mobilizing. Lower extremity surgery typically does require use of an anticoagulation medicine.   IF YOU HAD LOWER EXTREMITY SURGERY AND ARE NOT DISCHARGED HOME WITH ANY ANTICOAGULANT MEDICINE YOU SHOULD TAKE ASPIRIN 81mg DAILY FOR 30 DAYS POSTOPERATIVELY.  If you are discharged home with an anticoagulant such as Aspirin, Xarelto, Eliquis, Coumadin, or Lovenox, follow these simple instructions:   Notify surgeon immediately if any peter bleeding is noted in the urine, stool, emesis, or from the nose or the incision. Blood in the stool will often appear as black rather than red. Blood in urine may appear as pink. Blood in emesis may appear as brown/black like coffee grounds.  You will need to apply pressure for longer periods of time to any cuts or abrasions to stop bleeding  Avoid alcohol while taking anticoagulants  Most anticoagulants are to be taken for 30 days postoperatively. After this time, you may stop using them unless instructed otherwise.   If you were already taking an anticoagulant (commonly Aspirin, Coumadin, or Plavix) you will likely be resuming your normal dose postoperatively and will be continuing that medication at the discretion of the prescribing physician.  Stool Softeners: You will be at greater risk of constipation after surgery due to being less mobile and the pain medications.  Take stool softeners as needed. Over the counter Colace 100 mg 1-2 capsules twice daily can be taken.  If stools become too loose or too frequent, please decreases the dosage or stop the  stool softener.  If constipation occurs despite use of stool softeners, you are to continue the stool softeners and add a laxative (Milk of Magnesia 1 ounce daily as needed)  Drink plenty of fluids, and eat fruits and vegetables during your recovery time. Getting up and mobilizing will help the bowels to recover their regular function, as will weaning off of all narcotics when the pain becomes tolerable.  Pain Medications: Utilized after surgery are narcotics. This is some general information about these medications.  CLASSIFICATION: Pain medications are called Opioids and are narcotics  LEGALITIES: It is illegal to share narcotics with others  DRIVING: it is illegal to drive while under the influence of narcotics. Doing so is a DUI.  POTENTIAL SIDE EFFECTS: nausea, vomiting, itching, dizziness, drowsiness, dry mouth, constipation, and difficulty urinating.  POTENTIAL ADVERSE EFFECTS:  Opioid tolerance can develop with use of pain medications and this simply means that it requires more and more of the medication to control pain. However, this is seen more in patients that use opioids for longer periods of time.  Opioid dependence can develop with use of Opioids. People with opioid dependence will experience withdrawal symptoms upon cessation of the medication.  Opioid addiction can develop with use of Opioids. The incidence of this is very unlikely in patients who take the medications as ordered and stop the medications as instructed.  Opioid overdose can be dangerous, but is unlikely when the medication is taken as ordered and stopped when ordered. It is important not to mix opioids with alcohol as this can lead to over sedation and respiratory difficulty.  DOSAGE:  After the initial surgical pain begins to resolve, you may begin to decrease the pain medication. By the end of a few weeks, you should be off of pain medications.  Refills will not be given by the office during evening hours, on weekends, or after 6  weeks post-op. You are responsible for weaning off of pain medication. You can increase the time between narcotic pills, taking one every 4 then 6 then 8 hours and so on.  To seek refills on pain medications during the initial 6-week post-operative period, you must call the office to request the refill. The office will then notify you when to  the prescription. DO NOT wait until you are out of the medication to request a refill. Prescriptions will not be filled over the weekend and depending on the schedule, it may take a couple days for the prescription to be available. Someone will have to pick the prescription in person at the office.    FOLLOW-UP VISITS  You will need to follow up in the office with your surgeon in 3 weeks, or as instructed elsewhere in your discharge paperwork. Please call this number [phone] to schedule this appointment. If you are going to an SNF/SNU facility, they will arrange for you to follow up in the office.  If you have any concerns or suspected complications prior to your follow up visit, please call the office. Do not wait until your appointment time if you suspect complications. These will need to be addressed in the office promptly.      Alex Hernandez MD    Orthopaedic Surgery  [facility]

## 2024-12-27 NOTE — THERAPY EVALUATION
Patient Name: Dorie Moctezuma  : 1945    MRN: [id number]                              Today's Date: 2024       Admit Date: 2024    Visit Dx:     ICD-10-CM ICD-9-CM   1. Left hip pain  M25.552 719.45   2. Fall, initial encounter  W19.XXXA E888.9   3. Closed displaced subtrochanteric fracture of left femur, initial encounter  S72.22XA 820.22     Patient Active Problem List   Diagnosis    Hyperglycemia    Hyperkalemia    Mixed hyperlipidemia    Essential hypertension    Acquired hypothyroidism    Insomnia    Osteoporosis    Smoker    Breast lump    Chronic hepatitis C without hepatic coma    Generalized anxiety disorder    Dizziness    Elevated hemoglobin A1c    Fracture of femoral neck, left    Hyponatremia    Leukocytosis     Past Medical History:   Diagnosis Date    Hypertension     Hyperthyroidism      Past Surgical History:   Procedure Laterality Date    CHOLECYSTECTOMY        General Information       Row Name 24 1318          OT Time and Intention    Document Type evaluation  -MP     Mode of Treatment occupational therapy  -MP     Patient Effort good  -MP       Row Name 24 1318          General Information    Patient Profile Reviewed yes  -MP     Prior Level of Function independent:;ADL's  -MP     Existing Precautions/Restrictions fall;hip, anterior;left  -MP       Row Name 24 1318          Cognition    Orientation Status (Cognition) oriented to;place;disoriented to;person;situation;time  -MP       Row Name 24 1318          Safety Issues/Impairments Affecting Functional Mobility    Impairments Affecting Function (Mobility) balance;cognition;endurance/activity tolerance;range of motion (ROM);strength;pain;motor planning  -MP               User Key  (r) = Recorded By, (t) = Taken By, (c) = Cosigned By      Initials Name Provider Type    Alfredito Womack OT Occupational Therapist                     Mobility/ADL's       Row Name 24 1318          Bed Mobility     Bed Mobility bed mobility (all) activities  -MP     All Activities, [place] (Bed Mobility) minimum assist (75% patient effort);1 person assist  -MP     Assistive Device (Bed Mobility) head of bed elevated;repositioning sheet  -       Row Name 12/27/24 1318          Sit-Stand Transfer    Sit-Stand [place] (Transfers) minimum assist (75% patient effort);1 person assist;moderate assist (50% patient effort)  -       Row Name 12/27/24 1318          Activities of Daily Living    BADL Assessment/Intervention lower body dressing  -       Row Name 12/27/24 1318          Mobility    Extremity Weight-bearing Status left lower extremity  -MP     Left Lower Extremity (Weight-bearing Status) weight-bearing as tolerated (WBAT)  -       Row Name 12/27/24 1318          Lower Body Dressing Assessment/Training    [place] Level (Lower Body Dressing) lower body dressing skills;maximum assist (25% patient effort)  -MP               User Key  (r) = Recorded By, (t) = Taken By, (c) = Cosigned By      Initials Name Provider Type    Alfredito Womack OT Occupational Therapist                   Obj/Interventions       Row Name 12/27/24 1319          Range of Motion Comprehensive    Comment, General Range of Motion BUE WFL  -       Row Name 12/27/24 1319          Strength Comprehensive (MMT)    Comment, General Manual Muscle Testing (MMT) Assessment BUE 3+/5  -       Row Name 12/27/24 1319          Balance    Balance Interventions sitting;sit to stand;standing;supported;static;dynamic  -               User Key  (r) = Recorded By, (t) = Taken By, (c) = Cosigned By      Initials Name Provider Type    Alfredito Womack OT Occupational Therapist                   Goals/Plan       Row Name 12/27/24 1320          Bed Mobility Goal 1 (OT)    Activity/Assistive Device (Bed Mobility Goal 1, OT) bed mobility activities, all  -MP     [place] Level/Cues Needed (Bed Mobility Goal 1, OT) contact guard required   -MP     Time Frame (Bed Mobility Goal 1, OT) long term goal (LTG);2 weeks  -MP       Row Name 12/27/24 1320          Transfer Goal 1 (OT)    Activity/Assistive Device (Transfer Goal 1, OT) sit-to-stand/stand-to-sit;toilet  -MP     [place] Level/Cues Needed (Transfer Goal 1, OT) contact guard required  -MP     Time Frame (Transfer Goal 1, OT) long term goal (LTG);2 weeks  -MP       Row Name 12/27/24 1320          Dressing Goal 1 (OT)    Activity/Device (Dressing Goal 1, OT) lower body dressing  -MP     [place]/Cues Needed (Dressing Goal 1, OT) minimum assist (75% or more patient effort)  -MP     Time Frame (Dressing Goal 1, OT) long term goal (LTG);2 weeks  -MP               User Key  (r) = Recorded By, (t) = Taken By, (c) = Cosigned By      Initials Name Provider Type    MP Alfredito Norton, GREG Occupational Therapist                   Clinical Impression       Row Name 12/27/24 1310          Pain Scale: FACES Pre/Post-Treatment    Pain: FACES Scale, Pretreatment 0-->no hurt  -MP     Posttreatment Pain Rating 6-->hurts even more  -MP       Row Name 12/27/24 1315          Plan of Care Review    Plan of Care Reviewed With patient  -MP     Progress no change  -MP     Outcome Evaluation 78 yo female adm 12/24/24 after fall at home. Pt fell when family dog jumped up on her and she fell, sustaining L hip fx. Now s/p L atha on 12/26. PMH: hepatitis C, smoker. At baseline, pt lives alone at baseline and maintains ADL independence, IADL support from family. Pt. requires min-mod A for all bed mobility and SPS transfer EOB to armchair, limited standing tolerance due to fatigue and increased pain. Pt. requires max A for all LB ADLs donning brief from standing this date w/ assist to thread and manage over hips. Pt. not safe to d/c home alone and will require SNF placement at d/c.  -MP       Row Name 12/27/24 8261          Therapy Assessment/Plan (OT)    Rehab Potential (OT) good  -MP     Criteria for Skilled  Therapeutic Interventions Met (OT) yes;meets criteria;skilled treatment is necessary  -MP     Therapy Frequency (OT) 3 times/wk  -MP       Row Name 12/27/24 1319          Therapy Plan Review/Discharge Plan (OT)    Anticipated Discharge Disposition (OT) skilled nursing facility  -MP       Row Name 12/27/24 1319          Vital Signs    Pre Patient Position Supine  -MP     Intra Patient Position Standing  -MP     Post Patient Position Sitting  -MP       Row Name 12/27/24 1319          Positioning and Restraints    Pre-Treatment Position in bed  -MP     Post Treatment Position chair  -MP     In Chair call light within reach;sitting;exit alarm on;encouraged to call for assist  -MP               User Key  (r) = Recorded By, (t) = Taken By, (c) = Cosigned By      Initials Name Provider Type    Alfredito Womack OT Occupational Therapist                   Outcome Measures       Row Name 12/27/24 1149 12/27/24 0832       How much help from another person do you currently need...    Turning from your back to your side while in flat bed without using bedrails? 3  -CM 2  -LP    Moving from lying on back to sitting on the side of a flat bed without bedrails? 3  -CM 2  -LP    Moving to and from a bed to a chair (including a wheelchair)? 3  -CM 3  -LP    Standing up from a chair using your arms (e.g., wheelchair, bedside chair)? 3  -CM 3  -LP    Climbing 3-5 steps with a railing? 1  -CM 2  -LP    To walk in hospital room? 2  -CM 3  -LP    AM-PAC 6 Clicks Score (PT) 15  -CM 15  -LP              User Key  (r) = Recorded By, (t) = Taken By, (c) = Cosigned By      Initials Name Provider Type    Dona Monteiro, PT Physical Therapist    Dawna Gibson LPN Licensed Nurse                    Occupational Therapy Education       Title: PT OT SLP Therapies (In Progress)       Topic: Occupational Therapy (In Progress)       Point: ADL training (Not Started)       Description:   Instruct learner(s) on proper safety adaptation  and remediation techniques during self care or transfers.   Instruct in proper use of assistive devices.                  Learner Progress:  Not documented in this visit.              Point: Home exercise program (Not Started)       Description:   Instruct learner(s) on appropriate technique for monitoring, assisting and/or progressing therapeutic exercises/activities.                  Learner Progress:  Not documented in this visit.              Point: Precautions (Done)       Description:   Instruct learner(s) on prescribed precautions during self-care and functional transfers.                  Learning Progress Summary            Patient Acceptance, E,TB, VU,NR by  at 12/27/2024 1321                      Point: Body mechanics (Done)       Description:   Instruct learner(s) on proper positioning and spine alignment during self-care, functional mobility activities and/or exercises.                  Learning Progress Summary            Patient Acceptance, E,TB, VU,NR by  at 12/27/2024 1321                                      User Key       Initials Effective Dates Name Provider Type Discipline     06/16/21 -  Alfredito Nroton OT Occupational Therapist OT                  OT Recommendation and Plan  Therapy Frequency (OT): 3 times/wk  Plan of Care Review  Plan of Care Reviewed With: patient  Progress: no change  Outcome Evaluation: 78 yo female adm 12/24/24 after fall at home. Pt fell when family dog jumped up on her and she fell, sustaining L hip fx. Now s/p L atha on 12/26. PMH: hepatitis C, smoker. At baseline, pt lives alone at baseline and maintains ADL independence, IADL support from family. Pt. requires min-mod A for all bed mobility and SPS transfer EOB to armchair, limited standing tolerance due to fatigue and increased pain. Pt. requires max A for all LB ADLs donning brief from standing this date w/ assist to thread and manage over hips. Pt. not safe to d/c home alone and will require SNF  placement at d/c.     Time Calculation:         Time Calculation- OT       Row Name 12/27/24 1321             Time Calculation- OT    OT Start Time 0935  -MP      OT Stop Time 0955  -      OT Time Calculation (min) 20 min  -      Total Timed Code Minutes- OT 20 minute(s)  -      OT Received On 12/27/24  -      OT - Next Appointment 12/30/24  -      OT Goal Re-Cert Due Date 01/10/25  -                User Key  (r) = Recorded By, (t) = Taken By, (c) = Cosigned By      Initials Name Provider Type    Alfredito Womack OT Occupational Therapist                  Therapy Charges for Today       Code Description Service Date Service Provider Modifiers Qty    [id number] HC OT EVAL LOW COMPLEXITY 4 12/27/2024 Alfredito Norton OT GO 1                 Alfredito Norton OT  12/27/2024

## 2024-12-27 NOTE — BRIEF OP NOTE
TOTAL HIP ARTHROPLASTY ANTERIOR WITH HANA TABLE  Progress Note    Dorie Moctezuma  12/26/2024    Pre-op Diagnosis:   Left femoral neck fracture         Post-Op Diagnosis Codes:   Left femoral neck fracture     Procedure/CPT® Codes:        Procedure(s):  TOTAL HIP ARTHROPLASTY ANTERIOR WITH HANA TABLE    Surgical Approach: Hip Direct Anterior ([place])            Surgeon(s):  Alex Hernandez MD    Anesthesia: General    Staff:   Circulator: Tommy Camejo RN; Charito Corea RN; Pretty Cruz RN  Scrub Person: Liz العلي; Karen Cooper  Vendor Representative: Juan Lorenzo  Assistant: Opal Burton CSA  Assistant: Opal Burton CSA      Estimated Blood Loss: 200ml    Urine Voided: * No values recorded between 12/26/2024  5:41 PM and 12/26/2024  7:15 PM *    Specimens:                None          Drains: * No LDAs found *    Findings: left femoral neck fracture         Complications: none evident     Assistant: Opal Burton CSA  was responsible for performing the following activities: Retraction, Suction, and Irrigation and their skilled assistance was necessary for the success of this case.    Alex Hernandez MD     Date: 12/26/2024  Time: 19:41 EST

## 2024-12-27 NOTE — PLAN OF CARE
Goal Outcome Evaluation:   Minimal complaints of pain. Pt did not ambulate after surgery due to do confusion, concern for pt safety regarding ability to follow directions to safely ambulate. Turning Q2. Call light within reach, plan of care on going.

## 2024-12-27 NOTE — THERAPY EVALUATION
Patient Name: Dorie Moctezuma  : 1945    MRN: [id number]                              Today's Date: 2024       Admit Date: 2024    Visit Dx:     ICD-10-CM ICD-9-CM   1. Left hip pain  M25.552 719.45   2. Fall, initial encounter  W19.XXXA E888.9   3. Closed displaced subtrochanteric fracture of left femur, initial encounter  S72.22XA 820.22     Patient Active Problem List   Diagnosis    Hyperglycemia    Hyperkalemia    Mixed hyperlipidemia    Essential hypertension    Acquired hypothyroidism    Insomnia    Osteoporosis    Smoker    Breast lump    Chronic hepatitis C without hepatic coma    Generalized anxiety disorder    Dizziness    Elevated hemoglobin A1c    Fracture of femoral neck, left    Hyponatremia    Leukocytosis     Past Medical History:   Diagnosis Date    Hypertension     Hyperthyroidism      Past Surgical History:   Procedure Laterality Date    CHOLECYSTECTOMY        General Information       Row Name 24 1137          Physical Therapy Time and Intention    Document Type evaluation  -CM     Mode of Treatment physical therapy  -       Row Name 24 1137          General Information    Patient Profile Reviewed yes  -CM     Prior Level of Function independent:;all household mobility;gait  pt denies use of assistive device at home, but very confused, so question accuracy of this  -CM     Existing Precautions/Restrictions fall;hip, anterior;left  -CM     Barriers to Rehab cognitive status  -CM       Row Name 24 1137          Living Environment    People in Home alone  -CM       Row Name 24 1137          Home Main Entrance    Number of Stairs, Main Entrance none  -CM       Row Name 24 1137          Stairs Within Home, Primary    Number of Stairs, Within Home, Primary none  -CM       Row Name 24 1137          Cognition    Orientation Status (Cognition) oriented to;place;disoriented to;person;situation;time  knew she had fallen at home but unaware that she  had broken hip or undergone sx  -CM       Row Name 12/27/24 1137          Safety Issues/Impairments Affecting Functional Mobility    Safety Issues Affecting Function (Mobility) at risk behavior observed;awareness of need for assistance;impulsivity;insight into deficits/self-awareness;judgment;positioning of assistive device;safety precaution awareness;problem-solving;safety precautions follow-through/compliance  -CM     Impairments Affecting Function (Mobility) balance;cognition;endurance/activity tolerance;range of motion (ROM);strength;pain;motor planning  -CM               User Key  (r) = Recorded By, (t) = Taken By, (c) = Cosigned By      Initials Name Provider Type    CM Dona Martinez, PT Physical Therapist                   Mobility       Row Name 12/27/24 0916          Bed Mobility    Bed Mobility bed mobility (all) activities  -CM     All Activities, [place] (Bed Mobility) minimum assist (75% patient effort);1 person assist  -CM     Assistive Device (Bed Mobility) head of bed elevated;repositioning sheet  -CM       Row Name 12/27/24 0916          Bed-Chair Transfer    Bed-Chair [place] (Transfers) not tested  -CM       Row Name 12/27/24 0916          Sit-Stand Transfer    Sit-Stand [place] (Transfers) minimum assist (75% patient effort);1 person assist;moderate assist (50% patient effort)  -CM     Assistive Device (Sit-Stand Transfers) walker, front-wheeled  -CM       Row Name 12/27/24 0916          Gait/Stairs (Locomotion)    [place] Level (Gait) minimum assist (75% patient effort);2 person assist  -CM     Assistive Device (Gait) walker, front-wheeled  -CM     Patient was able to Ambulate yes  -CM     Distance in Feet (Gait) 10  slightly impulsive; difficulty coordinating movement of rw; pt c/o fatigue quickly in standing  -CM     Deviations/Abnormal Patterns (Gait) antalgic;bilateral deviations;gait speed decreased  -CM     Bilateral Gait Deviations forward flexed posture  -CM        [facility] 12/27/24 1139 12/27/24 0916       Mobility    Extremity Weight-bearing Status left lower extremity  -CM left lower extremity  -CM    Left Lower Extremity (Weight-bearing Status) weight-bearing as tolerated (WBAT)  -CM weight-bearing as tolerated (WBAT)  -CM              User Key  (r) = Recorded By, (t) = Taken By, (c) = Cosigned By      Initials Name Provider Type    CM Dona Martinez, PT Physical Therapist                   Obj/Interventions       Row Name 12/27/24 1142          Range of Motion Comprehensive    General Range of Motion no range of motion deficits identified  -CM       Row Name 12/27/24 1142          Strength Comprehensive (MMT)    General Manual Muscle Testing (MMT) Assessment lower extremity strength deficits identified  -CM     Comment, General Manual Muscle Testing (MMT) Assessment L hip 3-/5, L knee 3+/5, L ankle 5/5; all other mm groups 4/5  -CM       Row Name 12/27/24 1142          Balance    Balance Assessment sitting static balance;sitting dynamic balance;standing static balance;standing dynamic balance  -CM     Static Sitting Balance supervision  -CM     Dynamic Sitting Balance standby assist;contact guard  -CM     Position, Sitting Balance unsupported;sitting edge of bed  -CM     Static Standing Balance minimal assist  -CM     Dynamic Standing Balance minimal assist;moderate assist  -CM     Position/Device Used, Standing Balance walker, front-wheeled  -CM       Row Name 12/27/24 1142          Sensory Assessment (Somatosensory)    Sensory Assessment (Somatosensory) sensation intact  -CM               User Key  (r) = Recorded By, (t) = Taken By, (c) = Cosigned By      Initials Name Provider Type    Dona Monteiro, PT Physical Therapist                   Goals/Plan       Row Name 12/27/24 1149          Bed Mobility Goal 1 (PT)    Activity/Assistive Device (Bed Mobility Goal 1, PT) bed mobility activities, all  -CM     [place] Level/Cues Needed (Bed Mobility Goal 1, PT)  "supervision required  -CM     Time Frame (Bed Mobility Goal 1, PT) 2 weeks  -CM       Row Name 12/27/24 1149          Transfer Goal 1 (PT)    Activity/Assistive Device (Transfer Goal 1, PT) transfers, all;walker, rolling  -CM     [place] Level/Cues Needed (Transfer Goal 1, PT) contact guard required  -CM     Time Frame (Transfer Goal 1, PT) 2 weeks  -CM       Row Name 12/27/24 1149          Gait Training Goal 1 (PT)    Activity/Assistive Device (Gait Training Goal 1, PT) gait (walking locomotion);walker, rolling  -CM     [place] Level (Gait Training Goal 1, PT) contact guard required;minimum assist (75% or more patient effort)  -CM     Distance (Gait Training Goal 1, PT) 100 ft  -CM     Time Frame (Gait Training Goal 1, PT) 2 weeks  -CM       Row Name 12/27/24 1149          Therapy Assessment/Plan (PT)    Planned Therapy Interventions (PT) balance training;bed mobility training;gait training;home exercise program;neuromuscular re-education;motor coordination training;patient/family education;postural re-education;strengthening;ROM (range of motion);transfer training  -CM               User Key  (r) = Recorded By, (t) = Taken By, (c) = Cosigned By      Initials Name Provider Type    CM Dona Martinez, PT Physical Therapist                   Clinical Impression       Row Name 12/27/24 1143          Pain    Pain Location hip  -CM     Pain Side/Orientation left  -CM     Pain Management Interventions activity modification encouraged;exercise or physical activity utilized;premedicated for activity;nursing notified  -CM     Response to Pain Interventions activity level improved;intervention effective per patient report;activity participation with tolerable pain  -CM     Pre/Posttreatment Pain Comment pt has difficulty rating pain numerically or describing level of pain at all. Finally stated it wasnt \"real bad\" but was \"there.\"  -CM     Additional Documentation Pain Scale: FACES Pre/Post-Treatment (Group)  -CM "       Row Name 12/27/24 1143          Pain Scale: FACES Pre/Post-Treatment    Pain: FACES Scale, Pretreatment 0-->no hurt  -CM     Posttreatment Pain Rating 6-->hurts even more  -CM       Row Name 12/27/24 1148 12/27/24 1143       Plan of Care Review    Plan of Care Reviewed With patient;other (see comments)  RN  -CM patient  -CM    Outcome Evaluation -- 80 yo female adm 12/24/24 after fall at home. Pt fell when family dog jumped up on her and she fell, sustaining L hip fx. Now s/p L atha on 12/26. PMH: hepatitis C, smoker. At baseline, pt lives alone in single level home. She denies having stairs. Later says she has upstairs but never goes up, then states she fell on stairs. So difficult to tell which is actual case. Pt oriented to place only. Comes to sit at eob w/ mod assist of 2. Comes to stand at rw w/ min to mod assist of 2. Able to amb 10 ft w/ rw and mod assist. Pt very confused this date and not safe for home alone. Will need SNF at d/c. Will follow.  -CM      Row Name 12/27/24 1148          Therapy Assessment/Plan (PT)    Rehab Potential (PT) good  -CM     Criteria for Skilled Interventions Met (PT) yes;meets criteria;skilled treatment is necessary  -CM     Therapy Frequency (PT) 5 times/wk  -CM     Predicted Duration of Therapy Intervention (PT) until d/c  -CM       Row Name 12/27/24 1148          Vital Signs    Pre SpO2 (%) 95  -CM     O2 Delivery Pre Treatment room air  -CM     O2 Delivery Intra Treatment room air  -CM     Post SpO2 (%) 98  -CM     O2 Delivery Post Treatment room air  -CM       Row Name 12/27/24 1148          Positioning and Restraints    Pre-Treatment Position in bed  -CM     Post Treatment Position chair  -CM     In Chair notified nsg;sitting;call light within reach;encouraged to call for assist;exit alarm on;legs elevated  -CM               User Key  (r) = Recorded By, (t) = Taken By, (c) = Cosigned By      Initials Name Provider Type    CM Dona Martinez, PT Physical Therapist                    Outcome Measures       Row Name 12/27/24 1149 12/27/24 0832       How much help from another person do you currently need...    Turning from your back to your side while in flat bed without using bedrails? 3  -CM 2  -LP    Moving from lying on back to sitting on the side of a flat bed without bedrails? 3  -CM 2  -LP    Moving to and from a bed to a chair (including a wheelchair)? 3  -CM 3  -LP    Standing up from a chair using your arms (e.g., wheelchair, bedside chair)? 3  -CM 3  -LP    Climbing 3-5 steps with a railing? 1  -CM 2  -LP    To walk in hospital room? 2  -CM 3  -LP    AM-PAC 6 Clicks Score (PT) 15  -CM 15  -LP              User Key  (r) = Recorded By, (t) = Taken By, (c) = Cosigned By      Initials Name Provider Type    Dona Monteiro, PT Physical Therapist    Dawna Gibson LPN Licensed Nurse                                 Physical Therapy Education       Title: PT OT SLP Therapies (Done)       Topic: Physical Therapy (Done)       Point: Mobility training (Done)       Learning Progress Summary            Patient Acceptance, E,TB, VU,NL,NR by  at 12/27/2024 1150                      Point: Home exercise program (Done)       Learning Progress Summary            Patient Acceptance, E,TB, VU,NL,NR by  at 12/27/2024 1150                      Point: Body mechanics (Done)       Learning Progress Summary            Patient Acceptance, E,TB, VU,NL,NR by  at 12/27/2024 1150                      Point: Precautions (Done)       Learning Progress Summary            Patient Acceptance, E,TB, VU,NL,NR by  at 12/27/2024 1150                                      User Key       Initials Effective Dates Name Provider Type Discipline     06/16/21 -  Dona Martinez, PT Physical Therapist PT                  PT Recommendation and Plan  Planned Therapy Interventions (PT): balance training, bed mobility training, gait training, home exercise program, neuromuscular re-education,  motor coordination training, patient/family education, postural re-education, strengthening, ROM (range of motion), transfer training  Outcome Evaluation: 78 yo female adm 12/24/24 after fall at home. Pt fell when family dog jumped up on her and she fell, sustaining L hip fx. Now s/p L atha on 12/26. PMH: hepatitis C, smoker. At baseline, pt lives alone in single level home. She denies having stairs. Later says she has upstairs but never goes up, then states she fell on stairs. So difficult to tell which is actual case. Pt oriented to place only. Comes to sit at eob w/ mod assist of 2. Comes to stand at rw w/ min to mod assist of 2. Able to amb 10 ft w/ rw and mod assist. Pt very confused this date and not safe for home alone. Will need SNF at d/c. Will follow.     Time Calculation:   PT Evaluation Complexity  History, PT Evaluation Complexity: 1-2 personal factors and/or comorbidities  Examination of Body Systems (PT Eval Complexity): total of 4 or more elements  Clinical Presentation (PT Evaluation Complexity): evolving  Clinical Decision Making (PT Evaluation Complexity): moderate complexity  Overall Complexity (PT Evaluation Complexity): moderate complexity     PT Charges       Row Name 12/27/24 1150             Time Calculation    Start Time 0916  -CM      Stop Time 0948  -CM      Time Calculation (min) 32 min  -CM      PT Received On 12/27/24  -CM      PT - Next Appointment 12/28/24  -CM      PT Goal Re-Cert Due Date 01/10/25  -CM         Time Calculation- PT    Total Timed Code Minutes- PT 0 minute(s)  -CM                User Key  (r) = Recorded By, (t) = Taken By, (c) = Cosigned By      Initials Name Provider Type    Dona Monteiro, PT Physical Therapist                  Therapy Charges for Today       Code Description Service Date Service Provider Modifiers Qty    30887175708 HC-PT EVAL MOD COMPLEXITY 5 12/27/2024 Dona Martinez, PT  1            PT G-Codes  AM-PAC 6 Clicks Score (PT): 15  PT  Discharge Summary  Anticipated Discharge Disposition (PT): skilled nursing facility    Doan Martinez, PT  12/27/2024

## 2024-12-27 NOTE — ANESTHESIA POSTPROCEDURE EVALUATION
Patient: Dorie Moctezuma    Procedure Summary       Date: 12/26/24 Room / Location: [facility] OR 01 / [facility] MAIN OR    Anesthesia Start: 1741 Anesthesia Stop:     Procedure: TOTAL HIP ARTHROPLASTY ANTERIOR WITH HANA TABLE (Left: Hip) Diagnosis:     Surgeons: Alex Hernandez MD Provider: Jero Real MD    Anesthesia Type: general ASA Status: 3            Anesthesia Type: general    Vitals  Vitals Value Taken Time   /56 12/26/24 2005   Temp 97.5 °F (36.4 °C) 12/26/24 1942   Pulse 88 12/26/24 2009   Resp 16 12/26/24 1957   SpO2 98 % 12/26/24 2009   Vitals shown include unfiled device data.        Post Anesthesia Care and Evaluation    Patient location during evaluation: PACU  Patient participation: complete - patient participated  Level of consciousness: awake  Pain scale: See nurse's notes for pain score.  Pain management: adequate    Airway patency: patent  Anesthetic complications: No anesthetic complications  PONV Status: none  Cardiovascular status: acceptable  Respiratory status: acceptable and spontaneous ventilation  Hydration status: acceptable    Comments: Patient seen and examined postoperatively; vital signs stable; SpO2 greater than or equal to 90%; cardiopulmonary status stable; nausea/vomiting adequately controlled; pain adequately controlled; no apparent anesthesia complications; patient discharged from anesthesia care when discharge criteria were met

## 2024-12-27 NOTE — PROGRESS NOTES
[facility] MEDICINE SERVICE  DAILY PROGRESS NOTE    NAME: Dorie Moctezuma  : 1945  MRN: [id number]      LOS: 3 days     PROVIDER OF SERVICE: Tab Mandel MD    Chief Complaint: Fracture of femoral neck, left    Subjective:     Interval History:  History taken from: patient    History of Present Illness: Dorie Moctezuma is a 79 y.o. female with a CMH of hyperlipidemia, hypertension, anxiety and depression, hypothyroidism osteoporosis who presented to [facility] on 2024 with left hip pain after mechanical fall at home.  Reports family dog jumped on her and caused her to fall onto her left hip.  She denies hitting her head or loss of consciousness she denies any other injury or precipitating factor for the fall.  She is awake and alert and answering appropriately.  Family at bedside.  X-ray hip and pelvis per radiology shows left femoral neck fracture.  Chest x-ray was negative per radiology, EKG shows normal sinus rhythm heart rate 71, labs show sodium of 130 chloride 93 glucose 113 WBC 14.11 and afebrile, urinalysis ordered and pending.  Orthopedics was consulted from the emergency department and advised she may not have surgery until Thursday given the holiday.     2024 patient seen and examined in bed no acute distress, vital signs stable, pain well-controlled, discussed with RN.        Doing overall well this morning, if she does not move she does not have pain, she is n.p.o. in preparation for surgery later on today      Status post surgical repair to the left femoral neck fracture done yesterday, patient tolerated the surgery well she had uneventful night, denied having any pain while she is resting in bed, has not been seen by physical therapy       Review of Systems  Constitutional: Negative.    HENT: Negative.     Eyes: Negative.    Respiratory: Negative.     Cardiovascular: Negative.    Gastrointestinal: Negative.    Endocrine: Negative.    Genitourinary:  Negative.    Musculoskeletal:  Positive for arthralgias.   Skin: Negative.    Allergic/Immunologic: Negative.    Neurological: Negative.    Hematological: Negative.    Psychiatric/Behavioral: Negative.     All other systems reviewed and are negative.  Objective:     Vital Signs  Temp:  [97.5 °F (36.4 °C)-99.1 °F (37.3 °C)] 97.8 °F (36.6 °C)  Heart Rate:  [61-96] 74  Resp:  [12-23] 16  BP: (119-155)/(53-89) 122/77  Flow (L/min) (Oxygen Therapy):  [2-4] 2   Body mass index is 23.08 kg/m².      Physical Exam       Appearance: Normal appearance. She is normal weight.   HENT:      Head: Normocephalic and atraumatic.      Right Ear: External ear normal.      Left Ear: External ear normal.      Nose: Nose normal.      Mouth/Throat:      Mouth: Mucous membranes are moist.   Eyes:      Extraocular Movements: Extraocular movements intact.   Cardiovascular:      Rate and Rhythm: Normal rate and regular rhythm.      Pulses: Normal pulses.      Heart sounds: Normal heart sounds.   Pulmonary:      Effort: Pulmonary effort is normal.      Breath sounds: Normal breath sounds.   Abdominal:      Palpations: Abdomen is soft.   Genitourinary:     Comments: deferred  Musculoskeletal:      Cervical back: Normal range of motion and neck supple.      Comments: ROM LLE limited due to fracture    Skin:     General: Skin is warm and dry.   Neurological:      General: No focal deficit present.      Mental Status: She is alert and oriented to person, place, and time.   Psychiatric:         Mood and Affect: Mood normal.         Behavior: Behavior normal.         Thought Content: Thought content normal.         Judgment: Judgment normal.            Diagnostic Data    Results from last 7 days   Lab Units 12/27/24  0518 12/26/24  0005   WBC 10*3/mm3 10.14 9.92   HEMOGLOBIN g/dL 12.0 12.4   HEMATOCRIT % 36.4 37.3   PLATELETS 10*3/mm3 208 209   GLUCOSE mg/dL  --  100*   CREATININE mg/dL  --  0.67   BUN mg/dL  --  10   SODIUM mmol/L  --  132*    POTASSIUM mmol/L  --  4.0   ANION GAP mmol/L  --  7.3       XR Hip With or Without Pelvis 1 View Left    Result Date: 12/26/2024  Impression: Status post left hip arthroplasty with no evidence of complication Electronically Signed: Adarsh Burgos  12/26/2024 8:12 PM EST  Workstation ID: [id number]       I reviewed the patient's new clinical results.    Assessment/Plan:     Active and Resolved Problems  Active Hospital Problems    Diagnosis  POA    **Fracture of femoral neck, left [S72.002A]  Yes    Hyponatremia [E87.1]  Yes    Leukocytosis [D72.829]  Yes    Generalized anxiety disorder [F41.1]  Yes    Essential hypertension [I10]  Yes    Mixed hyperlipidemia [E78.2]  Yes    Acquired hypothyroidism [E03.9]  Yes      Resolved Hospital Problems   No resolved problems to display.       Left femoral neck status post mechanical fall, orthopedics consulted, 0.5 mg Dilaudid every 4 hours as needed  Status post left total hip arthroplasty done on 12/26/2024  PT OT evaluation   Pain control       Leukocytosis, WBC 14 afebrile chest x-ray and urinalysis negative , it was reactive, by this morning her white blood count is back to normal     Generalized anxiety disorder, no home meds     Essential hypertension  Controlled on Toprol-XL 25 mg p.o. daily      Acquired hypothyroidism, cont levothyroxine    VTE Prophylaxis:  No VTE prophylaxis order currently exists.    Disposition Planning:   Barriers to Discharge: Femoral neck fracture  Anticipated Date of Discharge: 12/28  Place of Discharge: NH      Time: 30 minutes     Code Status and Medical Interventions: CPR (Attempt to Resuscitate); Full Support   Ordered at: 12/24/24 4826     Code Status (Patient has no pulse and is not breathing):    CPR (Attempt to Resuscitate)     Medical Interventions (Patient has pulse or is breathing):    Full Support       Signature: Electronically signed by Tab Mandel MD, 12/27/24, 11:06 EST.  [facility], [place], operated by [facility] Hospitalist Team

## 2024-12-27 NOTE — PLAN OF CARE
Goal Outcome Evaluation:               Pt disoriented to time and situation. Pain treated per MAR. Pt ambulated assist x1 with PT. Fall precautions in place. Call light within reach. Plan of care ongoing.

## 2024-12-27 NOTE — PLAN OF CARE
Goal Outcome Evaluation:  Plan of Care Reviewed With: patient        Progress: no change  Outcome Evaluation: 78 yo female adm 12/24/24 after fall at home. Pt fell when family dog jumped up on her and she fell, sustaining L hip fx. Now s/p L atha on 12/26. PMH: hepatitis C, smoker. At baseline, pt lives alone at baseline and maintains ADL independence, IADL support from family. Pt. requires min-mod A for all bed mobility and SPS transfer EOB to armchair, limited standing tolerance due to fatigue and increased pain. Pt. requires max A for all LB ADLs donning brief from standing this date w/ assist to thread and manage over hips. Pt. not safe to d/c home alone and will require SNF placement at d/c.    Anticipated Discharge Disposition (OT): skilled nursing facility

## 2024-12-27 NOTE — PLAN OF CARE
Goal Outcome Evaluation:  Plan of Care Reviewed With: patient           Outcome Evaluation: 78 yo female adm 12/24/24 after fall at home. Pt fell when family dog jumped up on her and she fell, sustaining L hip fx. Now s/p L atha on 12/26. PMH: hepatitis C, smoker. At baseline, pt lives alone in single level home. She denies having stairs. Later says she has upstairs but never goes up, then states she fell on stairs. So difficult to tell which is actual case. Pt oriented to place only. Comes to sit at eob w/ mod assist of 2. Comes to stand at rw w/ min to mod assist of 2. Able to amb 10 ft w/ rw and mod assist. Pt very confused this date and not safe for home alone. Will need SNF at d/c. Will follow.

## 2024-12-28 LAB
ANION GAP SERPL CALCULATED.3IONS-SCNC: 11.2 MMOL/L (ref 5–15)
BASOPHILS # BLD AUTO: 0.02 10*3/MM3 (ref 0–0.2)
BASOPHILS NFR BLD AUTO: 0.2 % (ref 0–1.5)
BUN SERPL-MCNC: 12 MG/DL (ref 8–23)
BUN/CREAT SERPL: 21.4 (ref 7–25)
CALCIUM SPEC-SCNC: 9.3 MG/DL (ref 8.6–10.5)
CHLORIDE SERPL-SCNC: 99 MMOL/L (ref 98–107)
CO2 SERPL-SCNC: 27.8 MMOL/L (ref 22–29)
CREAT SERPL-MCNC: 0.56 MG/DL (ref 0.57–1)
DEPRECATED RDW RBC AUTO: 40.9 FL (ref 37–54)
EGFRCR SERPLBLD CKD-EPI 2021: 91.8 ML/MIN/1.73
EOSINOPHIL # BLD AUTO: 0.05 10*3/MM3 (ref 0–0.4)
EOSINOPHIL NFR BLD AUTO: 0.5 % (ref 0.3–6.2)
ERYTHROCYTE [DISTWIDTH] IN BLOOD BY AUTOMATED COUNT: 12.2 % (ref 12.3–15.4)
GLUCOSE SERPL-MCNC: 102 MG/DL (ref 65–99)
HCT VFR BLD AUTO: 32.7 % (ref 34–46.6)
HGB BLD-MCNC: 11.5 G/DL (ref 12–15.9)
IMM GRANULOCYTES # BLD AUTO: 0.03 10*3/MM3 (ref 0–0.05)
IMM GRANULOCYTES NFR BLD AUTO: 0.3 % (ref 0–0.5)
LYMPHOCYTES # BLD AUTO: 1.31 10*3/MM3 (ref 0.7–3.1)
LYMPHOCYTES NFR BLD AUTO: 12.9 % (ref 19.6–45.3)
MCH RBC QN AUTO: 31.9 PG (ref 26.6–33)
MCHC RBC AUTO-ENTMCNC: 35.2 G/DL (ref 31.5–35.7)
MCV RBC AUTO: 90.6 FL (ref 79–97)
MONOCYTES # BLD AUTO: 0.63 10*3/MM3 (ref 0.1–0.9)
MONOCYTES NFR BLD AUTO: 6.2 % (ref 5–12)
NEUTROPHILS NFR BLD AUTO: 79.9 % (ref 42.7–76)
NEUTROPHILS NFR BLD AUTO: 8.08 10*3/MM3 (ref 1.7–7)
NRBC BLD AUTO-RTO: 0 /100 WBC (ref 0–0.2)
PLATELET # BLD AUTO: 219 10*3/MM3 (ref 140–450)
PMV BLD AUTO: 10.9 FL (ref 6–12)
POTASSIUM SERPL-SCNC: 4.1 MMOL/L (ref 3.5–5.2)
RBC # BLD AUTO: 3.61 10*6/MM3 (ref 3.77–5.28)
SODIUM SERPL-SCNC: 138 MMOL/L (ref 136–145)
WBC NRBC COR # BLD AUTO: 10.12 10*3/MM3 (ref 3.4–10.8)

## 2024-12-28 PROCEDURE — 80048 BASIC METABOLIC PNL TOTAL CA: CPT | Performed by: INTERNAL MEDICINE

## 2024-12-28 PROCEDURE — 25010000002 ZIPRASIDONE MESYLATE PER 10 MG: Performed by: FAMILY MEDICINE

## 2024-12-28 PROCEDURE — 25010000002 HALOPERIDOL LACTATE PER 5 MG: Performed by: INTERNAL MEDICINE

## 2024-12-28 PROCEDURE — 85025 COMPLETE CBC W/AUTO DIFF WBC: CPT | Performed by: ORTHOPAEDIC SURGERY

## 2024-12-28 RX ORDER — QUETIAPINE FUMARATE 25 MG/1
25 TABLET, FILM COATED ORAL ONCE
Status: COMPLETED | OUTPATIENT
Start: 2024-12-28 | End: 2024-12-28

## 2024-12-28 RX ORDER — HALOPERIDOL 5 MG/ML
1 INJECTION INTRAMUSCULAR EVERY 6 HOURS PRN
Status: DISCONTINUED | OUTPATIENT
Start: 2024-12-28 | End: 2024-12-31 | Stop reason: HOSPADM

## 2024-12-28 RX ADMIN — WATER 10 MG: 1 INJECTION INTRAMUSCULAR; INTRAVENOUS; SUBCUTANEOUS at 06:56

## 2024-12-28 RX ADMIN — HALOPERIDOL LACTATE 1 MG: 5 INJECTION, SOLUTION INTRAMUSCULAR at 19:29

## 2024-12-28 RX ADMIN — QUETIAPINE FUMARATE 25 MG: 25 TABLET ORAL at 03:40

## 2024-12-28 NOTE — PROGRESS NOTES
Orthopaedic Progress Note     Pain well controlled. Dressing clean and dry, confused this AM    NAD  Alert but confused   Respirations are nonlabored   LLE   Dressing clean and dry  Able to DF and PF at the ankle   Sensation is intact distally   Foot is wwp   Compartments are soft and compressible     79 year old female with left femoral neck fracture   POD 2 left VASHTI   -WBAT LLE    -to chair if able  -pain control   -ice and elevation   -PT   -dvt ppx to baseline AC or to ASA 81 mg BID for 6 weeks   -please call/chat  with any questions or concerns.    Alex Hernandez MD   [facility]   [phone] - [place] Office  [phone] - [place] Office

## 2024-12-28 NOTE — PLAN OF CARE
Goal Outcome Evaluation:      Pt confused and disoriented. Pt has no complaints of pain at this time. SHANTELLE dressing in place. Pt up to chair with one person assistance. VS stable. Plan of care ongoing.

## 2024-12-28 NOTE — SIGNIFICANT NOTE
12/28/24 1229   OTHER   Discipline physical therapy assistant   Rehab Time/Intention   Session Not Performed other (see comments)  (RN asked to hold tx today due to patient had been trying to climb oob and was given geodon)   Recommendation   PT - Next Appointment 12/29/24

## 2024-12-28 NOTE — PROGRESS NOTES
[facility] MEDICINE SERVICE  DAILY PROGRESS NOTE    NAME: Dorie Moctezuma  : 1945  MRN: [id number]      LOS: 4 days     PROVIDER OF SERVICE: Tab Mandel MD    Chief Complaint: Fracture of femoral neck, left    Subjective:     Interval History:  History taken from: patient    History of Present Illness: Dorie Moctezuma is a 79 y.o. female with a CMH of hyperlipidemia, hypertension, anxiety and depression, hypothyroidism osteoporosis who presented to [facility] on 2024 with left hip pain after mechanical fall at home.  Reports family dog jumped on her and caused her to fall onto her left hip.  She denies hitting her head or loss of consciousness she denies any other injury or precipitating factor for the fall.  She is awake and alert and answering appropriately.  Family at bedside.  X-ray hip and pelvis per radiology shows left femoral neck fracture.  Chest x-ray was negative per radiology, EKG shows normal sinus rhythm heart rate 71, labs show sodium of 130 chloride 93 glucose 113 WBC 14.11 and afebrile, urinalysis ordered and pending.  Orthopedics was consulted from the emergency department and advised she may not have surgery until Thursday given the holiday.     2024 patient seen and examined in bed no acute distress, vital signs stable, pain well-controlled, discussed with RN.        Doing overall well this morning, if she does not move she does not have pain, she is n.p.o. in preparation for surgery later on today      Status post surgical repair to the left femoral neck fracture done yesterday, patient tolerated the surgery well she had uneventful night, denied having any pain while she is resting in bed, has not been seen by physical therapy      Overnight patient became delirious and agitated and early this morning had to receive a dose of Geodon to calm her down as she was trying to get out of bed       Review of Systems  Constitutional: Negative.     HENT: Negative.     Eyes: Negative.    Respiratory: Negative.     Cardiovascular: Negative.    Gastrointestinal: Negative.    Endocrine: Negative.    Genitourinary: Negative.    Musculoskeletal:  Positive for arthralgias.   Skin: Negative.    Allergic/Immunologic: Negative.    Neurological: Negative.    Hematological: Negative.    Psychiatric/Behavioral: Negative.     All other systems reviewed and are negative.  Objective:     Vital Signs  Temp:  [98.2 °F (36.8 °C)-98.5 °F (36.9 °C)] 98.5 °F (36.9 °C)  Heart Rate:  [75-87] 87  Resp:  [15-16] 15  BP: (128-147)/(69-80) 128/73  Flow (L/min) (Oxygen Therapy):  [2] 2   Body mass index is 23.08 kg/m².      Physical Exam       Appearance: Normal appearance. She is normal weight.   HENT:      Head: Normocephalic and atraumatic.      Right Ear: External ear normal.      Left Ear: External ear normal.      Nose: Nose normal.      Mouth/Throat:      Mouth: Mucous membranes are moist.   Eyes:      Extraocular Movements: Extraocular movements intact.   Cardiovascular:      Rate and Rhythm: Normal rate and regular rhythm.      Pulses: Normal pulses.      Heart sounds: Normal heart sounds.   Pulmonary:      Effort: Pulmonary effort is normal.      Breath sounds: Normal breath sounds.   Abdominal:      Palpations: Abdomen is soft.   Genitourinary:     Comments: deferred  Musculoskeletal:      Cervical back: Normal range of motion and neck supple.      Comments: ROM LLE limited due to fracture    Skin:     General: Skin is warm and dry.   Neurological:      General: No focal deficit present.      Mental Status: She is alert and oriented to person, place, and time.   Psychiatric:         Mood and Affect: Mood normal.         Behavior: Behavior normal.         Thought Content: Thought content normal.         Judgment: Judgment normal.            Diagnostic Data    Results from last 7 days   Lab Units 12/28/24  0425 12/27/24  0518 12/26/24  0005   WBC 10*3/mm3 10.12   < > 9.92    HEMOGLOBIN g/dL 11.5*   < > 12.4   HEMATOCRIT % 32.7*   < > 37.3   PLATELETS 10*3/mm3 219   < > 209   GLUCOSE mg/dL  --   --  100*   CREATININE mg/dL  --   --  0.67   BUN mg/dL  --   --  10   SODIUM mmol/L  --   --  132*   POTASSIUM mmol/L  --   --  4.0   ANION GAP mmol/L  --   --  7.3    < > = values in this interval not displayed.       XR Hip With or Without Pelvis 1 View Left    Result Date: 12/26/2024  Impression: Status post left hip arthroplasty with no evidence of complication Electronically Signed: Adarsh Burgos  12/26/2024 8:12 PM EST  Workstation ID: [id number]       I reviewed the patient's new clinical results.    Assessment/Plan:     Active and Resolved Problems  Active Hospital Problems    Diagnosis  POA    **Fracture of femoral neck, left [S72.002A]  Yes    Hyponatremia [E87.1]  Yes    Leukocytosis [D72.829]  Yes    Generalized anxiety disorder [F41.1]  Yes    Essential hypertension [I10]  Yes    Mixed hyperlipidemia [E78.2]  Yes    Acquired hypothyroidism [E03.9]  Yes      Resolved Hospital Problems   No resolved problems to display.       Left femoral neck status post mechanical fall, orthopedics consulted, 0.5 mg Dilaudid every 4 hours as needed  Status post left total hip arthroplasty done on 12/26/2024  PT OT evaluation   Pain control      Delirium/agitation  Clearly precipitated by patient acute illness/surgery  Avoid sedatives  Continuous orientation, keep windows open, patient been afebrile, no obvious source of infection, no leukocytosis  Haldol as needed     Leukocytosis, WBC 14 afebrile chest x-ray and urinalysis negative , it was reactive, by this morning her white blood count is back to normal     Generalized anxiety disorder, no home meds     Essential hypertension  Controlled on Toprol-XL 25 mg p.o. daily      Acquired hypothyroidism, cont levothyroxine    VTE Prophylaxis:  No VTE prophylaxis order currently exists.    Disposition Planning:   Barriers to Discharge: Femoral neck  fracture  Anticipated Date of Discharge: 12/28  Place of Discharge: [facility]      Time: 30 minutes     Code Status and Medical Interventions: CPR (Attempt to Resuscitate); Full Support   Ordered at: 12/24/24 2328     Code Status (Patient has no pulse and is not breathing):    CPR (Attempt to Resuscitate)     Medical Interventions (Patient has pulse or is breathing):    Full Support       Signature: Electronically signed by Tab Mandel MD, 12/28/24, 12:24 EST.  [facility] Hospitalist Team

## 2024-12-29 LAB
BASOPHILS # BLD AUTO: 0.05 10*3/MM3 (ref 0–0.2)
BASOPHILS NFR BLD AUTO: 0.6 % (ref 0–1.5)
DEPRECATED RDW RBC AUTO: 43.6 FL (ref 37–54)
EOSINOPHIL # BLD AUTO: 0.2 10*3/MM3 (ref 0–0.4)
EOSINOPHIL NFR BLD AUTO: 2.4 % (ref 0.3–6.2)
ERYTHROCYTE [DISTWIDTH] IN BLOOD BY AUTOMATED COUNT: 12.8 % (ref 12.3–15.4)
HCT VFR BLD AUTO: 37.1 % (ref 34–46.6)
HGB BLD-MCNC: 12.7 G/DL (ref 12–15.9)
IMM GRANULOCYTES # BLD AUTO: 0.02 10*3/MM3 (ref 0–0.05)
IMM GRANULOCYTES NFR BLD AUTO: 0.2 % (ref 0–0.5)
LYMPHOCYTES # BLD AUTO: 1.69 10*3/MM3 (ref 0.7–3.1)
LYMPHOCYTES NFR BLD AUTO: 20.4 % (ref 19.6–45.3)
MCH RBC QN AUTO: 31.6 PG (ref 26.6–33)
MCHC RBC AUTO-ENTMCNC: 34.2 G/DL (ref 31.5–35.7)
MCV RBC AUTO: 92.3 FL (ref 79–97)
MONOCYTES # BLD AUTO: 0.87 10*3/MM3 (ref 0.1–0.9)
MONOCYTES NFR BLD AUTO: 10.5 % (ref 5–12)
NEUTROPHILS NFR BLD AUTO: 5.45 10*3/MM3 (ref 1.7–7)
NEUTROPHILS NFR BLD AUTO: 65.9 % (ref 42.7–76)
NRBC BLD AUTO-RTO: 0 /100 WBC (ref 0–0.2)
PLATELET # BLD AUTO: 251 10*3/MM3 (ref 140–450)
PMV BLD AUTO: 10.8 FL (ref 6–12)
RBC # BLD AUTO: 4.02 10*6/MM3 (ref 3.77–5.28)
WBC NRBC COR # BLD AUTO: 8.28 10*3/MM3 (ref 3.4–10.8)

## 2024-12-29 PROCEDURE — 97530 THERAPEUTIC ACTIVITIES: CPT

## 2024-12-29 PROCEDURE — 97116 GAIT TRAINING THERAPY: CPT

## 2024-12-29 PROCEDURE — 25010000002 HALOPERIDOL LACTATE PER 5 MG: Performed by: INTERNAL MEDICINE

## 2024-12-29 PROCEDURE — 85025 COMPLETE CBC W/AUTO DIFF WBC: CPT | Performed by: ORTHOPAEDIC SURGERY

## 2024-12-29 RX ORDER — ASPIRIN 81 MG/1
81 TABLET, CHEWABLE ORAL 2 TIMES DAILY
Status: DISCONTINUED | OUTPATIENT
Start: 2024-12-29 | End: 2024-12-31 | Stop reason: HOSPADM

## 2024-12-29 RX ADMIN — ASPIRIN 81 MG CHEWABLE TABLET 81 MG: 81 TABLET CHEWABLE at 21:02

## 2024-12-29 RX ADMIN — LEVOTHYROXINE SODIUM 50 MCG: 0.05 TABLET ORAL at 05:09

## 2024-12-29 RX ADMIN — METOPROLOL SUCCINATE 25 MG: 25 TABLET, EXTENDED RELEASE ORAL at 11:35

## 2024-12-29 RX ADMIN — HALOPERIDOL LACTATE 1 MG: 5 INJECTION, SOLUTION INTRAMUSCULAR at 05:04

## 2024-12-29 NOTE — PLAN OF CARE
Assessment: Dorie Moctezuma presents with functional mobility impairments which indicate the need for skilled intervention. Tolerating session today without incident. Pt very pleasant and cooperative. Wanted to finish her lunch so was able to get her to chair in a better position to eat. Should be able to amb further tomorrow. Plans on rehab at [place].Will continue to follow and progress as tolerated.

## 2024-12-29 NOTE — THERAPY TREATMENT NOTE
"Subjective: Pt agreeable to therapeutic plan of care. Pt  had meds earlier and not appropriate but RN called in pm and pt very awake and alert    Objective:     Precautions - falls, L ant WBAT    Bed mobility - Min-A  Transfers - Min-A and with rolling walker  Ambulation - 5 feet Min-A and with rolling walker      Vitals: WNL on 2LO2    Pain:  minimal pain LLE with movement Intervention for pain: Repositioned, RN notified, Increased Activity, and Therapeutic Presence    Education: Provided education on the importance of mobility in the acute care setting, Verbal/Tactile Cues, Transfer Training, WB'ing status, and Post-Op Precautions    Assessment: Dorie Moctezuma presents with functional mobility impairments which indicate the need for skilled intervention. Tolerating session today without incident. Pt very pleasant and cooperative. Wanted to finish her lunch so was able to get her to chair in a better position to eat. Should be able to amb further tomorrow. Plans on rehab at [facility].Will continue to follow and progress as tolerated.     Plan/Recommendations:   If medically appropriate, Moderate Intensity Therapy recommended post-acute care. This is recommended as therapy feels the patient would require 3-4 days per week and wouldn't tolerate \"3 hour daily\" rehab intensity. SNF would be the preferred choice. If the patient does not agree to SNF, arrange HH or OP depending on home bound status. If patient is medically complex, consider LTACH. Pt requires no DME at discharge.     Pt desires Skilled Rehab placement at discharge. Pt cooperative; agreeable to therapeutic recommendations and plan of care.         Basic Mobility 6-click:  Rollin = Total, A lot = 2, A little = 3; 4 = None  Supine>Sit:   1 = Total, A lot = 2, A little = 3; 4 = None   Sit>Stand with arms:  1 = Total, A lot = 2, A little = 3; 4 = None  Bed>Chair:   1 = Total, A lot = 2, A little = 3; 4 = None  Ambulate in room:  1 = Total, A lot = 2, " A little = 3; 4 = None  3-5 Steps with railin = Total, A lot = 2, A little = 3; 4 = None  Score: 16      Post-Tx Position: Up in Chair, Alarms activated, and Call light and personal items within reach  PPE: gloves    Therapy Charges for Today       Code Description Service Date Service Provider Modifiers Qty    57368940367  PT THERAPEUTIC ACT EA 15 MIN 2024 Melissa Keane, JOSE GP 1    [id number] HC GAIT TRAINING EA 15 MIN 2024 Melissa Keane, JOSE GP 1           PT Charges       Row Name 24 1539             Time Calculation    Start Time 1510  -      Stop Time 1530  -      Time Calculation (min) 20 min  -      PT Received On 24  -      PT - Next Appointment 24  -         Time Calculation- PT    Total Timed Code Minutes- PT 20 minute(s)  -                User Key  (r) = Recorded By, (t) = Taken By, (c) = Cosigned By      Initials Name Provider Type     Melissa Keane PTA Physical Therapist Assistant

## 2024-12-29 NOTE — OP NOTE
Anterior Total Hip Operative Note  Alex Hernandez MD  [phone]    PATIENT NAME: Dorie Moctezuma  MRN: [id number]  : 1945 AGE: 79 y.o. GENDER: female  DATE OF OPERATION: 2024  PREOPERATIVE DIAGNOSIS: left femoral neck fracture   POSTOPERATIVE DIAGNOSIS: Same  OPERATION PERFORMED: Left Anterior Total Hip Arthroplasty  SURGEON: Alex Hernandez MD  Circulator: Tommy Camejo RN; Charito Corea RN; Pretty Cruz RN  Radiology Technologist: Jolie Cisse  Scrub Person: Liz العلي; Karen Cooper  Vendor Representative: Juan Lorenzo  Assistant: Opal Burton CSA  ANESTHESIA: General  ASSISTANT:  Opal Burton CSA. This case would not have been possible without another set of skilled surgical hands for retraction, bone reduction, use of instrumentation, assistance with implants, and closure.  This assistance was vital to the success of the case.   ESTIMATED BLOOD LOSS: 200cc  SPONGE AND NEEDLE COUNT: Correct  INDICATIONS:  Fracture: This patient was noted to have a femoral neck fracture.  Surgical options were discussed with the patient and they elected to undergo a total hip replacement. A discussion of operative versus nonoperative treatment was had. They elected to undergo anterior total hip arthroplasty. The risks of surgery were discussed and included the risk of anesthesia, infection, damage to neurovascular structures, implant loosening/failure, fracture, hardware prominence, dislocation, the need for further procedures, medical complications, and others. No guarantees were made. The patient wished to proceed with surgery and a surgical consent was signed.  COMPONENTS:   Acetabular Cup: depuy emphasys 50 Outer Diameter  Cup Screws: No Screws Were Used  Depuy liner Neutral  Depuy summit cemented stem 3  Depuy head: 36mm +1.5    PERTINENT FINDINGS: Fracture: Fracture of the femoral neck    DETAILS OF PROCEDURE:   The patient was met in the preoperative area. The  site was marked. The consent and H&P were reviewed. The patient was then wheeled back to the operative suite underwent anesthesia. The [place] table boots were secured to the patients’ feet. The patient was moved onto the [place] table and secured in the supine position. The perineal post was inserted and the boots were secured into the leg holders. Surgical alcohol was used to thoroughly clean the operative area.     The hip and leg was then prepped in the normal sterile fashion, multiple layers of sterile drapes, and surgical space suits for the entire operative team. New outer gloves were used by all sterile surgical team members after final draping. The surgical incision was marked. A surgical timeout was performed.    A Modified Harper-Cuellar anterior approach was used. Dissection was carried down to the fascia. The fascia was incised and the tensor fascia criselda muscle was retracted laterally and the sartorius medially. The lateral femoral circumflex vessels were identified and cauterized using bovie. The rectus femoris was retracted medially. A capsulotomy was then performed. The capsule was tagged with Ethibond for later repair. Retractors were placed on either side of the femoral neck and dissection was further carried down so that the lesser trochanter could be palpated and the superior rim of the acetabulum could be visualized.    The femoral neck cut was then made from  just  distal to the fracture site but proximal to the lesser trochanter medially headed towards the saddle laterally. Care was taken not to extend the cut into the lesser or greater trochanters. The head and neck segment were removed with a corkscrew and fracture fragments removed with rongeur. The acetabulum was then exposed. The labrum was removed using a kocher and scalpel and excess osteophytes were removed using an osteotome.    The acetabulum was progressively reamed, beginning with medialization and then finalizing the position of the  reamer to approximate the final cup position. Fluoroscopy was used to ensure proper placement of the reamer, including adequate medialization as well as appropriate abduction and anteversion. The real cup was then opened and inserted using fluoroscopy, ensuring good position in terms of abduction and anteversion.     No Screws: Initial press-fit fixation of the cup was very robust and no screws were required for supplemental fixation.    After thorough irrigation and ensuring that no soft tissue was entrapped within the cup, the real liner was snapped into place.    The hook was placed just distal to the greater trochanter. The femur was then externally rotated, extended and adducted under the well leg. Soft tissue releases were performed to gain exposure to the proximal femur. Capsule was released from the saddle and the lateral femur. Care was taken to preserve the short external rotator tendons. The capsule was also released along the medial femur. The hydraulic femoral lift was then used to better expose the femur. Further soft tissue releases were then performed, again ensuring preservation of the short external rotators.    The femur was machined with a cookie cutter osteotome and then a rasp was used to further lateralize the starting point. The sclerotic bone on the lateral shoulder of the femur was removed with a rongeour and curette as needed to protect the greater trochanter. Progressive broaches were inserted until adequate fill had been achieved. Using fluoroscopy, the femoral stem was visualized after trial reduction of the hip. The length and offset were compared to the non-operative hip. Trials of stem size and neck length were trialed until equal leg length and offset were obtained. Additionally hip stability was tested with internal and external rotation of the leg. The leg was stable with at least 90° of external rotation and there was no impingement at 60° of internal rotation.   Trial implants  were removed and the femur was irrigated and prepped for cementation with thoroghly drying of the canal. A cement restrictor was placed. Cement was mixed and inserted using gun . Cement was pressurized with finger packing. Stem was inserted and excess cement removed and cement was then allowed to harden. Final head ball was impacted into place.   After implantation of the final stem and ball, the leg was once again brought into normal anatomic position and relocated. Final x-rays were taken with final implants noting good position of the stem and cup, and no visualized fracture.. The hip was stable upon reduction.    An analgesic cocktail was then injected about the hip as well as the surgical dissection area. 2 g of vancomycin was then placed deep within the hip capsule. The capsule was closed.  The fascia was then closed with a running stitch and the skin was closed in layers.  A sterile dressing was applied.    The patient was moved from the [place] table to the [place] where the boots were removed. The patient was taken to the recovery room in stable condition. There were no complications and the patient tolerated the procedure well.    Alex Hernandez MD  Orthopaedic Surgery  [facility]  [phone]

## 2024-12-29 NOTE — PLAN OF CARE
Goal Outcome Evaluation:    Patient is more alert today and was able to work with PT today and sit up in chair. Patient still needs to have a BM, refuses suppository. No c/o pain.

## 2024-12-29 NOTE — PROGRESS NOTES
[facility] MEDICINE SERVICE  DAILY PROGRESS NOTE    NAME: Dorie Moctezuma  : 1945  MRN: [id number]      LOS: 5 days     PROVIDER OF SERVICE: Jaylin Restrepo MD    Chief Complaint: Fracture of femoral neck, left    Subjective:     Interval History:  History taken from: patient    Seen and examined at bedside, found to be confused        Review of Systems:   Review of Systems    Objective:     Vital Signs  Temp:  [97.6 °F (36.4 °C)-98.3 °F (36.8 °C)] 97.6 °F (36.4 °C)  Heart Rate:  [76-79] 79  Resp:  [15-16] 16  BP: (125-157)/(75-84) 138/78  Flow (L/min) (Oxygen Therapy):  [2] 2   Body mass index is 23.08 kg/m².    Physical Exam  Physical Exam  Constitutional:       Comments: NAD    Cardiovascular:      Comments:  RRR, S1 & S2   Pulmonary:      Comments:  Lungs CTA   Abdominal:      Comments:  ABD soft, NT   Neurological:      Mental Status: She is disoriented.            Diagnostic Data    Results from last 7 days   Lab Units 24  0518 24  1559   WBC 10*3/mm3 8.28  --    HEMOGLOBIN g/dL 12.7  --    HEMATOCRIT % 37.1  --    PLATELETS 10*3/mm3 251  --    GLUCOSE mg/dL  --  102*   CREATININE mg/dL  --  0.56*   BUN mg/dL  --  12   SODIUM mmol/L  --  138   POTASSIUM mmol/L  --  4.1   ANION GAP mmol/L  --  11.2       No radiology results for the last day      I reviewed the patient's new clinical results.    Assessment/Plan:     Active and Resolved Problems  Active Hospital Problems    Diagnosis  POA    **Fracture of femoral neck, left [S72.002A]  Yes    Hyponatremia [E87.1]  Yes    Leukocytosis [D72.829]  Yes    Generalized anxiety disorder [F41.1]  Yes    Essential hypertension [I10]  Yes    Mixed hyperlipidemia [E78.2]  Yes    Acquired hypothyroidism [E03.9]  Yes      Resolved Hospital Problems   No resolved problems to display.       Left femoral neck status post mechanical fall, orthopedics consulted POD 2 Left VASHTI, 0.5 mg Dilaudid every 4 hours as needed  Status post left total  hip arthroplasty done on 12/26/2024  PT OT evaluation   Will start on asa 81mg BID as per ortho sx  Pain control       Delirium/agitation  Clearly precipitated by patient acute illness/surgery  Avoid sedatives  Continuous orientation, keep windows open, patient been afebrile, no obvious source of infection, no leukocytosis  Haldol as needed     Leukocytosis, resolved     Generalized anxiety disorder, no home meds     Essential hypertension  Controlled on Toprol-XL 25 mg p.o. daily      Acquired hypothyroidism, cont levothyroxine    VTE Prophylaxis:  No VTE prophylaxis order currently exists.                  Time: 35 minutes     Code Status and Medical Interventions: CPR (Attempt to Resuscitate); Full Support   Ordered at: 12/24/24 2639     Code Status (Patient has no pulse and is not breathing):    CPR (Attempt to Resuscitate)     Medical Interventions (Patient has pulse or is breathing):    Full Support       Signature: Electronically signed by Jaylin Restrepo MD, 12/29/24, 13:42 EST.  [facility] Hospitalist Team

## 2024-12-29 NOTE — PLAN OF CARE
Goal Outcome Evaluation:      Pt confused and disoriented, hyperactive and difficult to re-orient. Pt treated with PRN IM Haldol, intervention effective at this time. Pt requiring no pain medication at this time. VS stable. Plan of care ongoing.

## 2024-12-30 LAB
ANION GAP SERPL CALCULATED.3IONS-SCNC: 10.3 MMOL/L (ref 5–15)
BASOPHILS # BLD AUTO: 0.06 10*3/MM3 (ref 0–0.2)
BASOPHILS # BLD AUTO: 0.07 10*3/MM3 (ref 0–0.2)
BASOPHILS NFR BLD AUTO: 0.6 % (ref 0–1.5)
BASOPHILS NFR BLD AUTO: 0.7 % (ref 0–1.5)
BUN SERPL-MCNC: 12 MG/DL (ref 8–23)
BUN/CREAT SERPL: 25 (ref 7–25)
CALCIUM SPEC-SCNC: 8.4 MG/DL (ref 8.6–10.5)
CHLORIDE SERPL-SCNC: 96 MMOL/L (ref 98–107)
CO2 SERPL-SCNC: 27.7 MMOL/L (ref 22–29)
CREAT SERPL-MCNC: 0.48 MG/DL (ref 0.57–1)
DEPRECATED RDW RBC AUTO: 42.4 FL (ref 37–54)
DEPRECATED RDW RBC AUTO: 42.5 FL (ref 37–54)
EGFRCR SERPLBLD CKD-EPI 2021: 95.3 ML/MIN/1.73
EOSINOPHIL # BLD AUTO: 0.33 10*3/MM3 (ref 0–0.4)
EOSINOPHIL # BLD AUTO: 0.35 10*3/MM3 (ref 0–0.4)
EOSINOPHIL NFR BLD AUTO: 3.4 % (ref 0.3–6.2)
EOSINOPHIL NFR BLD AUTO: 3.7 % (ref 0.3–6.2)
ERYTHROCYTE [DISTWIDTH] IN BLOOD BY AUTOMATED COUNT: 12.5 % (ref 12.3–15.4)
ERYTHROCYTE [DISTWIDTH] IN BLOOD BY AUTOMATED COUNT: 12.5 % (ref 12.3–15.4)
GLUCOSE SERPL-MCNC: 103 MG/DL (ref 65–99)
HCT VFR BLD AUTO: 35.7 % (ref 34–46.6)
HCT VFR BLD AUTO: 36.6 % (ref 34–46.6)
HGB BLD-MCNC: 12.1 G/DL (ref 12–15.9)
HGB BLD-MCNC: 12.4 G/DL (ref 12–15.9)
IMM GRANULOCYTES # BLD AUTO: 0.04 10*3/MM3 (ref 0–0.05)
IMM GRANULOCYTES # BLD AUTO: 0.07 10*3/MM3 (ref 0–0.05)
IMM GRANULOCYTES NFR BLD AUTO: 0.4 % (ref 0–0.5)
IMM GRANULOCYTES NFR BLD AUTO: 0.7 % (ref 0–0.5)
LYMPHOCYTES # BLD AUTO: 1.68 10*3/MM3 (ref 0.7–3.1)
LYMPHOCYTES # BLD AUTO: 2.33 10*3/MM3 (ref 0.7–3.1)
LYMPHOCYTES NFR BLD AUTO: 17.6 % (ref 19.6–45.3)
LYMPHOCYTES NFR BLD AUTO: 24.3 % (ref 19.6–45.3)
MCH RBC QN AUTO: 31.1 PG (ref 26.6–33)
MCH RBC QN AUTO: 31.2 PG (ref 26.6–33)
MCHC RBC AUTO-ENTMCNC: 33.9 G/DL (ref 31.5–35.7)
MCHC RBC AUTO-ENTMCNC: 33.9 G/DL (ref 31.5–35.7)
MCV RBC AUTO: 91.7 FL (ref 79–97)
MCV RBC AUTO: 92 FL (ref 79–97)
MONOCYTES # BLD AUTO: 1 10*3/MM3 (ref 0.1–0.9)
MONOCYTES # BLD AUTO: 1.02 10*3/MM3 (ref 0.1–0.9)
MONOCYTES NFR BLD AUTO: 10.4 % (ref 5–12)
MONOCYTES NFR BLD AUTO: 10.6 % (ref 5–12)
NEUTROPHILS NFR BLD AUTO: 5.8 10*3/MM3 (ref 1.7–7)
NEUTROPHILS NFR BLD AUTO: 6.41 10*3/MM3 (ref 1.7–7)
NEUTROPHILS NFR BLD AUTO: 60.6 % (ref 42.7–76)
NEUTROPHILS NFR BLD AUTO: 67 % (ref 42.7–76)
NRBC BLD AUTO-RTO: 0 /100 WBC (ref 0–0.2)
NRBC BLD AUTO-RTO: 0 /100 WBC (ref 0–0.2)
PLATELET # BLD AUTO: 271 10*3/MM3 (ref 140–450)
PLATELET # BLD AUTO: 297 10*3/MM3 (ref 140–450)
PMV BLD AUTO: 10.4 FL (ref 6–12)
PMV BLD AUTO: 10.5 FL (ref 6–12)
POTASSIUM SERPL-SCNC: 4.3 MMOL/L (ref 3.5–5.2)
RBC # BLD AUTO: 3.88 10*6/MM3 (ref 3.77–5.28)
RBC # BLD AUTO: 3.99 10*6/MM3 (ref 3.77–5.28)
SODIUM SERPL-SCNC: 134 MMOL/L (ref 136–145)
WBC NRBC COR # BLD AUTO: 9.57 10*3/MM3 (ref 3.4–10.8)
WBC NRBC COR # BLD AUTO: 9.59 10*3/MM3 (ref 3.4–10.8)

## 2024-12-30 PROCEDURE — 25010000002 HALOPERIDOL LACTATE PER 5 MG: Performed by: INTERNAL MEDICINE

## 2024-12-30 PROCEDURE — 85025 COMPLETE CBC W/AUTO DIFF WBC: CPT | Performed by: ORTHOPAEDIC SURGERY

## 2024-12-30 PROCEDURE — 80048 BASIC METABOLIC PNL TOTAL CA: CPT

## 2024-12-30 RX ORDER — ASPIRIN 81 MG/1
81 TABLET, CHEWABLE ORAL 2 TIMES DAILY
Qty: 60 TABLET | Refills: 1 | Status: SHIPPED | OUTPATIENT
Start: 2024-12-30 | End: 2025-02-09

## 2024-12-30 RX ADMIN — HALOPERIDOL LACTATE 1 MG: 5 INJECTION, SOLUTION INTRAMUSCULAR at 03:42

## 2024-12-30 RX ADMIN — METOPROLOL SUCCINATE 25 MG: 25 TABLET, EXTENDED RELEASE ORAL at 09:53

## 2024-12-30 RX ADMIN — ASPIRIN 81 MG CHEWABLE TABLET 81 MG: 81 TABLET CHEWABLE at 21:29

## 2024-12-30 RX ADMIN — HYDROCODONE BITARTRATE AND ACETAMINOPHEN 1 TABLET: 5; 325 TABLET ORAL at 05:07

## 2024-12-30 RX ADMIN — ASPIRIN 81 MG CHEWABLE TABLET 81 MG: 81 TABLET CHEWABLE at 09:53

## 2024-12-30 RX ADMIN — BISACODYL 10 MG: 10 SUPPOSITORY RECTAL at 09:53

## 2024-12-30 RX ADMIN — LEVOTHYROXINE SODIUM 50 MCG: 0.05 TABLET ORAL at 05:07

## 2024-12-30 RX ADMIN — HYDROCODONE BITARTRATE AND ACETAMINOPHEN 1 TABLET: 5; 325 TABLET ORAL at 21:29

## 2024-12-30 NOTE — CASE MANAGEMENT/SOCIAL WORK
Continued Stay Note   [facility]     Patient Name: Dorie Moctezuma  MRN: [id number]  Today's Date: 12/30/2024    Admit Date: 12/24/2024    Plan: DC PLAN: Referral to .[facility] 2.[facility] awaiting responses. PASRR approved. No Precert needed.       Discharge Plan       Row Name 12/30/24 1157       Plan    Plan DC PLAN: Referral to .[facility] awaiting responses. PASRR approved. No Precert needed.        Patient/Family in Agreement with Plan yes    Plan Comments Recieved message that patient needs SNF. Went to bedside, delivered SNF list of facilities DCP report sent to 1. [facility] and 2. [facility], awaiting responses. PASRR approved, No precert needed.                      Expected Discharge Date and Time       Expected Discharge Date Expected Discharge Time    Dec 30, 2024           Charmaine Royal RN   Case Management  [phone]

## 2024-12-30 NOTE — PLAN OF CARE
Goal Outcome Evaluation: Pt oriented to self and person. Able to make needs known and call light in reach. Care plan ongoing.

## 2024-12-30 NOTE — PROGRESS NOTES
[facility] MEDICINE SERVICE  DAILY PROGRESS NOTE    NAME: Dorie Moctezuma  : 1945  MRN: [id number]      LOS: 6 days     PROVIDER OF SERVICE: Jaylin Restrepo MD    Chief Complaint: Fracture of femoral neck, left    Subjective:     Interval History:  History taken from: patient    Patient seen and examined at bedside this morning.  No acute complaints overnight.        Review of Systems: Negative except described above  Review of Systems    Objective:     Vital Signs  Temp:  [97.7 °F (36.5 °C)-98.4 °F (36.9 °C)] 97.7 °F (36.5 °C)  Heart Rate:  [70-75] 70  Resp:  [16] 16  BP: (114-151)/(69-84) 114/69  Flow (L/min) (Oxygen Therapy):  [1-2] 1   Body mass index is 23.08 kg/m².    Physical Exam  Physical Exam  Constitutional:       Comments: NAD    Cardiovascular:      Comments:  RRR, S1 & S2   Pulmonary:      Comments:  Lungs CTA   Abdominal:      Comments:  ABD soft, NT            Diagnostic Data    Results from last 7 days   Lab Units 24  0340 24  0518 24  1559   WBC 10*3/mm3 9.59   < >  --    HEMOGLOBIN g/dL 12.4   < >  --    HEMATOCRIT % 36.6   < >  --    PLATELETS 10*3/mm3 271   < >  --    GLUCOSE mg/dL  --   --  102*   CREATININE mg/dL  --   --  0.56*   BUN mg/dL  --   --  12   SODIUM mmol/L  --   --  138   POTASSIUM mmol/L  --   --  4.1   ANION GAP mmol/L  --   --  11.2    < > = values in this interval not displayed.       No radiology results for the last day      I reviewed the patient's new clinical results.    Assessment/Plan:     Active and Resolved Problems  Active Hospital Problems    Diagnosis  POA    **Fracture of femoral neck, left [S72.002A]  Yes    Hyponatremia [E87.1]  Yes    Leukocytosis [D72.829]  Yes    Generalized anxiety disorder [F41.1]  Yes    Essential hypertension [I10]  Yes    Mixed hyperlipidemia [E78.2]  Yes    Acquired hypothyroidism [E03.9]  Yes      Resolved Hospital Problems   No resolved problems to display.       Left femoral neck status  post mechanical fall, orthopedics consulted POD 2 Left VASHTI, 0.5 mg Dilaudid every 4 hours as needed  Status post left total hip arthroplasty done on 12/26/2024  PT OT evaluation   Continue on asa 81mg BID as per ortho sx  Pain control       Delirium/agitation  Clearly precipitated by patient acute illness/surgery  Avoid sedatives  Continuous orientation, keep windows open, patient been afebrile, no obvious source of infection, no leukocytosis  Haldol as needed     Leukocytosis, resolved     Generalized anxiety disorder, no home meds     Essential hypertension  Controlled on Toprol-XL 25 mg p.o. daily      Acquired hypothyroidism, cont levothyroxine    VTE Prophylaxis:  No VTE prophylaxis order currently exists.             Disposition Planning:      Anticipated Date of Discharge: Awaiting placement to rehab         Time: 35 minutes     Code Status and Medical Interventions: CPR (Attempt to Resuscitate); Full Support   Ordered at: 12/24/24 7987     Code Status (Patient has no pulse and is not breathing):    CPR (Attempt to Resuscitate)     Medical Interventions (Patient has pulse or is breathing):    Full Support       Signature: Electronically signed by Jaylin Restrepo MD, 12/30/24, 12:17 [facility].  [facility] Hospitalist Team

## 2024-12-30 NOTE — PLAN OF CARE
Goal Outcome Evaluation:      Pt confused and disoriented but cooperative at this time. Pt has no complaints of pain at this time. VS stable. Plan of care ongoing.

## 2024-12-30 NOTE — DISCHARGE PLACEMENT REQUEST
"Charles Moctezuma (79 y.o. Female)       Date of Birth   1945    Social Security Number       Address   [address] [place]    Home Phone   [phone]    MRN   [id number]       Mu-ism   None    Marital Status   Single                            Admission Date   12/24/24    Admission Type   Emergency    Admitting Provider   Ryne Velasquez MD    Attending Provider   Jaylin Restrepo MD    Department, Room/Bed   [facility] SURGICAL INPATIENT, 4129/1       Discharge Date       Discharge Disposition       Discharge Destination                                 Attending Provider: Jaylin Restrepo MD    Allergies: Benzocaine, Latex, Natural Rubber    Isolation: None   Infection: None   Code Status: CPR    Ht: 162.6 cm (64\")   Wt: 61 kg (134 lb 7.7 oz)    Admission Cmt: None   Principal Problem: Fracture of femoral neck, left [S72.002A]                   Active Insurance as of 12/24/2024       Primary Coverage       Payor Plan Insurance Group Employer/Plan Group    MEDICARE MEDICARE A & B        Payor Plan Address Payor Plan Phone Number Payor Plan Fax Number Effective Dates     [id number] [phone]  7/1/2008 - None Entered    [facility] [id number]         Subscriber Name Subscriber Birth Date Member ID       CHARLES MOCTEZUMA 1945 [id number]               Secondary Coverage       Payor Plan Insurance Group Employer/Plan Group    [place] [employer] [place]        Payor Plan Address Payor Plan Phone Number Payor Plan Fax Number Effective Dates    [address] [place] [address] [phone]  8/1/2015 - None Entered    [place] [address]         Subscriber Name Subscriber Birth Date Member ID       CHARLES MOCTEZUMA 1945 [id number]                     Emergency Contacts        (Rel.) Home Phone Work Phone Mobile Phone    green,tayla (Daughter) -- -- [phone]    Norm Harris -- -- [phone]            "

## 2024-12-30 NOTE — DISCHARGE SUMMARY
"             [facility] Medicine Services  Discharge Summary    Date of Service: 2024  Patient Name: Dorie Moctezuma  : 1945  MRN: [id number]    Date of Admission: 2024  Discharge Diagnosis: Fracture of femoral neck, left  Date of Discharge: 2024  Primary Care Physician: Radha Sorensen MD      Presenting Problem:   Left hip pain [M25.552]  Fracture of femoral neck, left [S72.002A]  Closed displaced subtrochanteric fracture of left femur, initial encounter [S72.22XA]  Fall, initial encounter [W19.XXXA]    Active and Resolved Hospital Problems:  Active Hospital Problems    Diagnosis POA    **Fracture of femoral neck, left [S72.002A] Yes    Hyponatremia [E87.1] Yes    Leukocytosis [D72.829] Yes    Generalized anxiety disorder [F41.1] Yes    Essential hypertension [I10] Yes    Mixed hyperlipidemia [E78.2] Yes    Acquired hypothyroidism [E03.9] Yes      Resolved Hospital Problems   No resolved problems to display.         Hospital Course     HPI:    \"Dorie Moctezuma is a 79 y.o. female with a CMH of hyperlipidemia, hypertension, anxiety and depression, hypothyroidism osteoporosis who presented to [facility] on 2024 with left hip pain after mechanical fall at home.  Reports family dog jumped on her and caused her to fall onto her left hip.  She denies hitting her head or loss of consciousness she denies any other injury or precipitating factor for the fall.  She is awake and alert and answering appropriately.  Family at bedside.  X-ray hip and pelvis per radiology shows left femoral neck fracture.  Chest x-ray was negative per radiology, EKG shows normal sinus rhythm heart rate 71, labs show sodium of 130 chloride 93 glucose 113 WBC 14.11 and afebrile, urinalysis ordered and pending.  Orthopedics was consulted from the emergency department and advised she may not have surgery until Thursday given the holiday. \"    Hospital Course:  Left femoral neck status post " mechanical fall, orthopedics consulted underwent Left VASHTI on 12/27, Status post left total hip arthroplasty done on 12/26/2024  PT OT evaluation, recommended rehab  Continue on asa 81mg BID x 6 weeks as per ortho sx  Pain control       Delirium/agitation  Clearly precipitated by patient acute illness/surgery  Avoid sedatives  Continuous orientation, keep windows open, patient been afebrile, no obvious source of infection, no leukocytosis  Haldol as needed     Leukocytosis, resolved     Generalized anxiety disorder, no home meds     Essential hypertension  Controlled on Toprol-XL 25 mg p.o. daily      Acquired hypothyroidism, cont levothyroxine        DISCHARGE Follow Up Recommendations for labs and diagnostics: Follow up with pcp in 1 week        Day of Discharge     Vital Signs:  Temp:  [97.7 °F (36.5 °C)-98.4 °F (36.9 °C)] 97.7 °F (36.5 °C)  Heart Rate:  [70-75] 70  Resp:  [16] 16  BP: (114-151)/(69-84) 114/69  Flow (L/min) (Oxygen Therapy):  [1-2] 1    Physical Exam:  Physical Exam  Constitutional:       Comments: NAD    Cardiovascular:      Comments:  RRR, S1 & S2   Pulmonary:      Comments:  Lungs CTA   Abdominal:      Comments:  ABD soft, NT             Pertinent  and/or Most Recent Results     LAB RESULTS:      Lab 12/30/24  0340 12/29/24  0518 12/28/24  0425 12/27/24  0518 12/26/24  0005   WBC 9.59 8.28 10.12 10.14 9.92   HEMOGLOBIN 12.4 12.7 11.5* 12.0 12.4   HEMATOCRIT 36.6 37.1 32.7* 36.4 37.3   PLATELETS 271 251 219 208 209   NEUTROS ABS 5.80 5.45 8.08* 9.29* 7.59*   IMMATURE GRANS (ABS) 0.04 0.02 0.03 0.04 0.04   LYMPHS ABS 2.33 1.69 1.31 0.44* 1.57   MONOS ABS 1.02* 0.87 0.63 0.35 0.52   EOS ABS 0.33 0.20 0.05 0.00 0.15   MCV 91.7 92.3 90.6 91.9 94.0   PROTIME  --   --   --   --  14.6*   APTT  --   --   --   --  28.6         Lab 12/28/24  1559 12/26/24  0005 12/25/24  0545 12/24/24  2246   SODIUM 138 132* 130* 130*   POTASSIUM 4.1 4.0 4.2 4.1   CHLORIDE 99 99 95* 93*   CO2 27.8 25.7 25.9 23.4   ANION  GAP 11.2 7.3 9.1 13.6   BUN 12 10 8 9   CREATININE 0.56* 0.67 0.65 0.61   EGFR 91.8 87.9 88.6 89.9   GLUCOSE 102* 100* 131* 113*   CALCIUM 9.3 8.6 9.2 9.4             Lab 12/26/24  0005   PROTIME 14.6*   INR 1.14*             Lab 12/26/24  0005   ABO TYPING A   RH TYPING Positive   ANTIBODY SCREEN Negative         Brief Urine Lab Results  (Last result in the past 365 days)        Color   Clarity   Blood   Leuk Est   Nitrite   Protein   CREAT   Urine HCG        12/24/24 2331 Yellow   Hazy   Negative   Negative   Negative   Negative                 Microbiology Results (last 10 days)       ** No results found for the last 240 hours. **            XR Hip With or Without Pelvis 1 View Left    Result Date: 12/26/2024  Impression: Impression: Status post left hip arthroplasty with no evidence of complication Electronically Signed: Adarsh Burgos  12/26/2024 8:12 PM EST  Workstation ID: [id number]    XR Chest 1 View    Result Date: 12/24/2024  Impression: No acute cardiopulmonary findings. Electronically Signed: Justo Talbert MD  12/24/2024 10:58 PM EST  Workstation ID: [id number]    XR Hip With or Without Pelvis 2 - 3 View Left    Result Date: 12/24/2024  Impression: Impression: Left femoral neck fracture Electronically Signed: Adarsh Burgos  12/24/2024 10:28 PM EST  Workstation ID: [id number]                 Labs Pending at Discharge:  Pending Results       Procedure [Order ID] Specimen - Date/Time    Basic Metabolic Panel [[id number]]     Specimen: Blood             Procedures Performed  Procedure(s):  TOTAL HIP ARTHROPLASTY ANTERIOR WITH HANA TABLE         Consults:   Consults       Date and Time Order Name Status Description    12/24/2024 11:02 PM Hospitalist (on-call MD unless specified)      12/24/2024 10:40 PM Ortho (on-call MD unless specified)                Discharge Details        Discharge Medications        ASK your doctor about these medications        Instructions Start Date   levothyroxine 50 MCG  tablet  Commonly known as: SYNTHROID, LEVOTHROID   50 mcg, Oral, Daily      metoprolol succinate XL 25 MG 24 hr tablet  Commonly known as: TOPROL-XL   25 mg, Oral, Daily      PARoxetine 10 MG tablet  Commonly known as: PAXIL   TAKE 1 TABLET BY MOUTH ONCE DAILY IN THE MORNING WITH  MID-MORNING  MEDICINE               Allergies   Allergen Reactions    Benzocaine Rash    Latex, Natural Rubber Rash         Discharge Disposition: Rehab      Diet:  Hospital:  Diet Order   Procedures    Diet: Regular/House; Fluid Consistency: Thin (IDDSI 0)         Discharge Activity:         CODE STATUS:  Code Status and Medical Interventions: CPR (Attempt to Resuscitate); Full Support   Ordered at: 12/24/24 5233     Code Status (Patient has no pulse and is not breathing):    CPR (Attempt to Resuscitate)     Medical Interventions (Patient has pulse or is breathing):    Full Support         No future appointments.        Time spent on Discharge including face to face service:  35 minutes    Signature: Electronically signed by Jaylin Restrepo MD, 12/30/24, 13:23 EST.  [facility] Hospitalist Team

## 2024-12-31 VITALS
WEIGHT: 134.48 LBS | SYSTOLIC BLOOD PRESSURE: 131 MMHG | HEART RATE: 80 BPM | HEIGHT: 64 IN | BODY MASS INDEX: 22.96 KG/M2 | RESPIRATION RATE: 16 BRPM | DIASTOLIC BLOOD PRESSURE: 81 MMHG | TEMPERATURE: 97.9 F | OXYGEN SATURATION: 96 %

## 2024-12-31 PROCEDURE — 97530 THERAPEUTIC ACTIVITIES: CPT

## 2024-12-31 PROCEDURE — 97110 THERAPEUTIC EXERCISES: CPT

## 2024-12-31 PROCEDURE — 97116 GAIT TRAINING THERAPY: CPT

## 2024-12-31 PROCEDURE — 97535 SELF CARE MNGMENT TRAINING: CPT

## 2024-12-31 RX ORDER — IPRATROPIUM BROMIDE AND ALBUTEROL SULFATE 2.5; .5 MG/3ML; MG/3ML
3 SOLUTION RESPIRATORY (INHALATION) ONCE AS NEEDED
Qty: 3 ML | Refills: 0 | Status: SHIPPED | OUTPATIENT
Start: 2024-12-31

## 2024-12-31 RX ORDER — HYDROCODONE BITARTRATE AND ACETAMINOPHEN 5; 325 MG/1; MG/1
1 TABLET ORAL EVERY 6 HOURS PRN
Qty: 10 TABLET | Refills: 0 | Status: SHIPPED | OUTPATIENT
Start: 2024-12-31 | End: 2025-01-03

## 2024-12-31 RX ORDER — POLYETHYLENE GLYCOL 3350 17 G/17G
17 POWDER, FOR SOLUTION ORAL DAILY
Qty: 5 PACKET | Refills: 0 | Status: SHIPPED | OUTPATIENT
Start: 2025-01-01 | End: 2025-01-06

## 2024-12-31 RX ORDER — POLYETHYLENE GLYCOL 3350 17 G/17G
17 POWDER, FOR SOLUTION ORAL DAILY
Status: DISCONTINUED | OUTPATIENT
Start: 2024-12-31 | End: 2024-12-31 | Stop reason: HOSPADM

## 2024-12-31 RX ORDER — DOCUSATE SODIUM 100 MG/1
100 CAPSULE, LIQUID FILLED ORAL 2 TIMES DAILY
Status: DISCONTINUED | OUTPATIENT
Start: 2024-12-31 | End: 2024-12-31 | Stop reason: HOSPADM

## 2024-12-31 RX ADMIN — METOPROLOL SUCCINATE 25 MG: 25 TABLET, EXTENDED RELEASE ORAL at 09:48

## 2024-12-31 RX ADMIN — DOCUSATE SODIUM 100 MG: 100 CAPSULE, LIQUID FILLED ORAL at 09:48

## 2024-12-31 RX ADMIN — POLYETHYLENE GLYCOL 3350 17 G: 17 POWDER, FOR SOLUTION ORAL at 09:48

## 2024-12-31 RX ADMIN — BISACODYL 10 MG: 10 SUPPOSITORY RECTAL at 09:48

## 2024-12-31 RX ADMIN — HYDROCODONE BITARTRATE AND ACETAMINOPHEN 1 TABLET: 5; 325 TABLET ORAL at 15:54

## 2024-12-31 RX ADMIN — HYDROCODONE BITARTRATE AND ACETAMINOPHEN 1 TABLET: 5; 325 TABLET ORAL at 04:44

## 2024-12-31 RX ADMIN — ASPIRIN 81 MG CHEWABLE TABLET 81 MG: 81 TABLET CHEWABLE at 09:48

## 2024-12-31 RX ADMIN — LEVOTHYROXINE SODIUM 50 MCG: 0.05 TABLET ORAL at 04:44

## 2024-12-31 NOTE — PLAN OF CARE
"Assessment: Dorie Moctezuma presents with functional mobility impairments which indicate the need for skilled intervention. Tolerating session today without incident. Pt pleasantly confused this session, requiring frequent cueing on safety. Pt not safe to d/c home - continue to recommend SNF at discharge. Will continue to follow and progress as tolerated.     Plan/Recommendations:   If medically appropriate, Moderate Intensity Therapy recommended post-acute care. This is recommended as therapy feels the patient would require 3-4 days per week and wouldn't tolerate \"3 hour daily\" rehab intensity. SNF would be the preferred choice. If the patient does not agree to SNF, arrange HH or OP depending on home bound status. If patient is medically complex, consider LTACH. Pt requires no DME at discharge.     Pt desires Skilled Rehab placement at discharge. Pt cooperative; agreeable to therapeutic recommendations and plan of care.     "

## 2024-12-31 NOTE — PLAN OF CARE
Goal Outcome Evaluation:         Pt is able to make needs known. Pt is disorientated to situation and time but can be reorientated. Pt reports pain that is treated via the MAR. Plan to discharge to rehab, family is able to transport. Pt has no complaints at this time.

## 2024-12-31 NOTE — THERAPY TREATMENT NOTE
"Subjective: Pt agreeable to therapeutic plan of care.    Objective:     WB'ing status: L Lower Extremity Weight Bearing As Tolerated    Therapeutic Exercise: 15 Reps L Lower Extremity AAROM Total Hip: Ankle Pumps, Glut Sets, Heel slides <90 degrees, Hip Abduction, Mini-Squat    Precautions: High falls risk and Anterior hip precautions    Bed mobility - Min-A    Transfers - Min-A and with rolling walker    Ambulation - 15 feet + 25 feet CGA and with rolling walker. Decreased safety awareness.     ADLs - Brief management Min A while standing. Pt able to complete hand hygiene while standing at sink level with setup assistance.     Vitals: WNL    Pain: Pt did not rate intensity of L hip pain this session.     Education: Provided education on the importance of mobility in the acute care setting, Verbal/Tactile Cues, Transfer Training, and Gait Training    Assessment: Dorie Moctezuma presents with functional mobility impairments which indicate the need for skilled intervention. Tolerating session today without incident. Pt pleasantly confused this session, requiring frequent cueing on safety. Pt not safe to d/c home - continue to recommend SNF at discharge. Will continue to follow and progress as tolerated.     Plan/Recommendations:   If medically appropriate, Moderate Intensity Therapy recommended post-acute care. This is recommended as therapy feels the patient would require 3-4 days per week and wouldn't tolerate \"3 hour daily\" rehab intensity. SNF would be the preferred choice. If the patient does not agree to SNF, arrange HH or OP depending on home bound status. If patient is medically complex, consider LTACH. Pt requires no DME at discharge.     Pt desires Skilled Rehab placement at discharge. Pt cooperative; agreeable to therapeutic recommendations and plan of care.         Basic Mobility 6-click:  Rollin = Total, A lot = 2, A little = 3; 4 = None  Supine>Sit:   1 = Total, A lot = 2, A little = 3; 4 = " None   Sit>Stand with arms:  1 = Total, A lot = 2, A little = 3; 4 = None  Bed>Chair:   1 = Total, A lot = 2, A little = 3; 4 = None  Ambulate in room:  1 = Total, A lot = 2, A little = 3; 4 = None  3-5 Steps with railin = Total, A lot = 2, A little = 3; 4 = None  Score: 16    Modified [place]: N/A = No pre-op stroke/TIA    Post-Tx Position: Up in Chair, Alarms activated, and Call light and personal items within reach  PPE: gloves    Therapy Charges for Today       Code Description Service Date Service Provider Modifiers Qty    33183981081 HC PT THERAPEUTIC ACT EA 15 MIN 2024 Fran Cowart, JOSE GP 1    93088665579 HC GAIT TRAINING EA 15 MIN 2024 Fran Cowart PTA GP 1    37371244766 HC PT SELF CARE/MGMT/TRAIN EA 15 MIN 2024 Fran Cowart, JOSE GP 1    93346971536 HC PT THER PROC EA 15 MIN 2024 Fran Cowart PTA GP 1           PT Charges       Row Name 24 1226             Time Calculation    Start Time 0900  -UN      Stop Time 0932  -UN      Time Calculation (min) 32 min  -UN      PT Received On 24  -UN      PT - Next Appointment 25  -UN         Time Calculation- PT    Total Timed Code Minutes- PT 32 minute(s)  -UN                User Key  (r) = Recorded By, (t) = Taken By, (c) = Cosigned By      Initials Name Provider Type    Fran Rojas PTA Physical Therapist Assistant

## 2024-12-31 NOTE — PLAN OF CARE
Goal Outcome Evaluation:   Pain controlled with PO pain meds. Ambulating to and from bathroom with walker and x1 assist. Disoriented to time, situation, and place, but is easily reoriented.  Pending rehab acceptance. Call light within reach, plan of care on going.

## 2025-01-01 NOTE — CASE MANAGEMENT/SOCIAL WORK
Case Management Discharge Note      Final Note: Patient discharged to [facility] SNF with family per private vehicle.          Destination Coordination complete.      Service Provider Services Address Phone Fax Patient Preferred    [facility] [address], [place] [address] [phone] --                 Transportation Services  Private: Car    Final Discharge Disposition Code: 03 - skilled nursing facility (SNF)

## 2025-01-09 ENCOUNTER — PATIENT OUTREACH (OUTPATIENT)
Dept: CASE MANAGEMENT | Facility: OTHER | Age: 80
End: 2025-01-09
Payer: MEDICARE

## 2025-01-09 NOTE — OUTREACH NOTE
AMBULATORY CASE MANAGEMENT NOTE    Names and Relationships of Patient/Support Persons: Contact: Dorie Moctezuma; Relationship: Self -     SNF Follow-up    Questions/Answers      Flowsheet Row Responses   Acute Facility Discharged From [facility]   Acute Discharge Date 12/31/24   Name of the Skilled Nursing Facility? [facility]   Purpose of SNF Admission PT, OT, SN   Estimated length of stay for the patient? TBD   Who is the insurance provider or payor of patient stay? Medicare        RN-ACM outreach call made to [facility]. Staff reports pt is still there.  dept unavailable for additional information. RN-ACM will continue to monitor for discharge.     Cristhian BURLESON  Ambulatory Case Management    1/9/2025, 14:00 EST

## 2025-01-16 ENCOUNTER — PATIENT OUTREACH (OUTPATIENT)
Dept: CASE MANAGEMENT | Facility: OTHER | Age: 80
End: 2025-01-16
Payer: MEDICARE

## 2025-01-16 NOTE — OUTREACH NOTE
AMBULATORY CASE MANAGEMENT NOTE    Names and Relationships of Patient/Support Persons: Contact: Dorie Moctezuma; Relationship: Self -     SNF Follow-up    Questions/Answers      Flowsheet Row Responses   Acute Facility Discharged From [facility]   Acute Discharge Date 12/31/24   Name of the Skilled Nursing Facility? [facility]   Purpose of SNF Admission PT, OT, SN   Estimated length of stay for the patient? TBD   Who is the insurance provider or payor of patient stay? Medicare        RN-ACM outreach call made to [facility]. Staff reports pt is still there.  dept unavailable for additional information. RN-ACM will continue to monitor for discharge.     Cristhian BURLESON  Ambulatory Case Management    1/16/2025, 10:43 EST

## 2025-01-23 ENCOUNTER — PATIENT OUTREACH (OUTPATIENT)
Dept: CASE MANAGEMENT | Facility: OTHER | Age: 80
End: 2025-01-23
Payer: MEDICARE

## 2025-01-23 NOTE — OUTREACH NOTE
AMBULATORY CASE MANAGEMENT NOTE    Names and Relationships of Patient/Support Persons: Contact: Dorie Moctezuma; Relationship: Self -     SNF Follow-up    Questions/Answers      Flowsheet Row Responses   [facility]   Acute Discharge Date 12/31/24   Name of the Skilled Nursing Facility? [facility]   Purpose of SNF Admission PT, OT, SN   Estimated length of stay for the patient? TBD   Who is the insurance provider or payor of patient stay? Medicare        RN-ACM outreach call made to [facility]. Staff reports pt is still there.  dept unavailable for additional information. RN-ACM will continue to monitor for discharge.     Cristhian BURLESON  Ambulatory Case Management    1/23/2025, 11:59 EST

## 2025-01-30 ENCOUNTER — PATIENT OUTREACH (OUTPATIENT)
Dept: CASE MANAGEMENT | Facility: OTHER | Age: 80
End: 2025-01-30
Payer: MEDICARE

## 2025-01-30 NOTE — OUTREACH NOTE
AMBULATORY CASE MANAGEMENT NOTE    Names and Relationships of Patient/Support Persons: Contact: Dorie Moctezuma; Relationship: Self -     SNF Follow-up    Questions/Answers      Flowsheet Row Responses   Acute Facility Discharged From [facility]   Acute Discharge Date 12/31/24   Name of the Skilled Nursing Facility? [facility]   Purpose of SNF Admission PT, OT, SN   Estimated length of stay for the patient? 4/9/25-possible discharge date- possible assisted living or home w/ care   Who is the insurance provider or payor of patient stay? Medicare        RN-ACM outreach call made to [facility]. Staff reports pt is still there and provided information on possible discharge date of 4/9/25- assisted living and/or home with family and additional care. RN-ACM will continue to monitor for discharge.     Cristhian BURLESON  Ambulatory Case Management    1/30/2025, 09:59 EST

## 2025-02-06 ENCOUNTER — PATIENT OUTREACH (OUTPATIENT)
Dept: CASE MANAGEMENT | Facility: OTHER | Age: 80
End: 2025-02-06
Payer: MEDICARE

## 2025-02-06 NOTE — OUTREACH NOTE
AMBULATORY CASE MANAGEMENT NOTE    Names and Relationships of Patient/Support Persons: Contact: Dorie Moctezuma; Relationship: Self -     SNF Follow-up    Questions/Answers      Flowsheet Row Responses   Acute Facility Discharged From [facility]   Acute Discharge Date 12/31/24   Name of the Skilled Nursing Facility? [facility]   Purpose of SNF Admission PT, OT, SN   Estimated length of stay for the patient? TBD   Who is the insurance provider or payor of patient stay? Medicare        RN-ACM outreach call made to [facility]. Staff reports pt is still there.  dept unavailable for additional information. RN-ACM will continue to monitor for discharge.       Crishtian BURLESON  Ambulatory Case Management    2/6/2025, 08:19 EST

## 2025-02-13 ENCOUNTER — PATIENT OUTREACH (OUTPATIENT)
Dept: CASE MANAGEMENT | Facility: OTHER | Age: 80
End: 2025-02-13
Payer: MEDICARE

## 2025-02-13 NOTE — OUTREACH NOTE
AMBULATORY CASE MANAGEMENT NOTE    Names and Relationships of Patient/Support Persons: Contact: Dorie Moctezuma; Relationship: Self -     SNF Follow-up    Questions/Answers      Flowsheet Row Responses   [facility]   Acute Discharge Date 12/31/24   Name of the Skilled Nursing Facility? [facility]   Purpose of SNF Admission PT, OT, SN   Estimated length of stay for the patient? TBD   Who is the insurance provider or payor of patient stay? Medicare          RN-ACM outreach call made to [facility]. Staff reports pt is still there.  dept unavailable for additional information. RN-ACM will continue to monitor for discharge.     Cristhian BURLESON  Ambulatory Case Management    2/13/2025, 09:10 EST

## 2025-02-20 ENCOUNTER — PATIENT OUTREACH (OUTPATIENT)
Dept: CASE MANAGEMENT | Facility: OTHER | Age: 80
End: 2025-02-20
Payer: MEDICARE

## 2025-02-20 NOTE — OUTREACH NOTE
AMBULATORY CASE MANAGEMENT NOTE    Names and Relationships of Patient/Support Persons: Contact: Dorie Moctezuma; Relationship: Self -     SNF Follow-up    Questions/Answers      Flowsheet Row Responses   Acute Facility Discharged From [facility]   Acute Discharge Date 12/31/24   Name of the Skilled Nursing Facility? [facility]   Purpose of SNF Admission PT, OT, SN   Estimated length of stay for the patient? TBD   Who is the insurance provider or payor of patient stay? Medicare          RN-ACM outreach call made to [facility]. Staff reports pt is still there. Pt. receiving for rehab services. RN-ACM will continue to monitor for discharge.     Cristhian BURLESON  Ambulatory Case Management    2/20/2025, 13:08 EST

## 2025-02-28 ENCOUNTER — PATIENT OUTREACH (OUTPATIENT)
Dept: CASE MANAGEMENT | Facility: OTHER | Age: 80
End: 2025-02-28
Payer: MEDICARE

## 2025-02-28 NOTE — OUTREACH NOTE
AMBULATORY CASE MANAGEMENT NOTE    Names and Relationships of Patient/Support Persons:  -     SNF Follow-up    Questions/Answers      Flowsheet Row Responses   Acute Facility Discharged From [facility]   Acute Discharge Date 12/31/24   Name of the Skilled Nursing Facility? [facility]   Purpose of SNF Admission PT, OT, SN   Estimated length of stay for the patient? TBD   Who is the insurance provider or payor of patient stay? Medicare        RN-ACM outreach call made to [facility]. Staff reports pt is still there.  dept unavailable for additional information. RN-ACM will continue to monitor for discharge.     Cristhian BURLESON  Ambulatory Case Management    2/28/2025, 10:04 EST

## 2025-03-06 ENCOUNTER — PATIENT OUTREACH (OUTPATIENT)
Dept: CASE MANAGEMENT | Facility: OTHER | Age: 80
End: 2025-03-06
Payer: MEDICARE

## 2025-03-06 NOTE — OUTREACH NOTE
AMBULATORY CASE MANAGEMENT NOTE    Names and Relationships of Patient/Support Persons: Contact: Dorie Moctezuma; Relationship: Self  Contact: Dorie Moctezuma; Relationship: Self -     SNF Follow-up    Questions/Answers      Flowsheet Row Responses   Acute Facility Discharged From [facility]   Acute Discharge Date 12/31/24   Name of the Skilled Nursing Facility? [facility]   Purpose of SNF Admission PT, OT, SN   Estimated length of stay for the patient? TBD   Who is the insurance provider or payor of patient stay? Medicare        RN-ACM outreach call made to [facility]. Staff reports pt is still there.  dept unavailable for additional information. RN-ACM will continue to monitor for discharge.     Cristhian BURLESON  Ambulatory Case Management    3/6/2025, 08:48 EST

## 2025-03-13 ENCOUNTER — PATIENT OUTREACH (OUTPATIENT)
Dept: CASE MANAGEMENT | Facility: OTHER | Age: 80
End: 2025-03-13
Payer: MEDICARE

## 2025-03-13 NOTE — OUTREACH NOTE
AMBULATORY CASE MANAGEMENT NOTE    Names and Relationships of Patient/Support Persons: Contact: Dorie Moctezuma; Relationship: Self -     SNF Follow-up    Questions/Answers      Flowsheet Row Responses   Acute Facility Discharged From [facility]   Acute Discharge Date 12/31/24   Name of the Skilled Nursing Facility? [facility]   Purpose of SNF Admission PT, OT, SN   Estimated length of stay for the patient? TBD   Who is the insurance provider or payor of patient stay? Medicare        RN-ACM outreach call made to [facility]. Staff reports pt is still there.  dept unavailable for additional information. RN-ACM will continue to monitor for discharge.       Cristhian BURLESON  Ambulatory Case Management    3/13/2025, 08:31 EDT

## 2025-03-20 ENCOUNTER — PATIENT OUTREACH (OUTPATIENT)
Dept: CASE MANAGEMENT | Facility: OTHER | Age: 80
End: 2025-03-20
Payer: MEDICARE

## 2025-03-20 NOTE — OUTREACH NOTE
AMBULATORY CASE MANAGEMENT NOTE    Names and Relationships of Patient/Support Persons: Contact: Dorie Moctezuma; Relationship: Self -     SNF Follow-up    Questions/Answers      Flowsheet Row Responses   Acute Facility Discharged From [facility]   Acute Discharge Date 12/31/24   Acute Facility Diagnoses fracture of left femur   Name of the Skilled Nursing Facility? [facility]   Purpose of SNF Admission PT, OT, SN   Estimated length of stay for the patient? TBD   Who is the insurance provider or payor of patient stay? Medicare        RN-ACM outreach call made to [facility]. Staff reports pt is still there. SW dept unavailable for additional information. RN-ACM will continue to monitor for discharge.       Cristhian BURLESON  Ambulatory Case Management    3/20/2025, 10:02 EDT

## 2025-04-08 ENCOUNTER — OFFICE VISIT (OUTPATIENT)
Dept: FAMILY MEDICINE CLINIC | Facility: CLINIC | Age: 80
End: 2025-04-08
Payer: MEDICARE

## 2025-04-08 VITALS
TEMPERATURE: 97.1 F | DIASTOLIC BLOOD PRESSURE: 77 MMHG | HEART RATE: 60 BPM | HEIGHT: 64 IN | BODY MASS INDEX: 22.71 KG/M2 | SYSTOLIC BLOOD PRESSURE: 153 MMHG | RESPIRATION RATE: 16 BRPM | OXYGEN SATURATION: 97 % | WEIGHT: 133 LBS

## 2025-04-08 DIAGNOSIS — E87.1 HYPONATREMIA: ICD-10-CM

## 2025-04-08 DIAGNOSIS — Z00.00 PREVENTATIVE HEALTH CARE: ICD-10-CM

## 2025-04-08 DIAGNOSIS — E03.9 ACQUIRED HYPOTHYROIDISM: ICD-10-CM

## 2025-04-08 DIAGNOSIS — R73.9 HYPERGLYCEMIA: ICD-10-CM

## 2025-04-08 DIAGNOSIS — F17.200 SMOKER: Primary | ICD-10-CM

## 2025-04-08 DIAGNOSIS — S72.002A CLOSED FRACTURE OF NECK OF LEFT FEMUR, INITIAL ENCOUNTER: ICD-10-CM

## 2025-04-08 DIAGNOSIS — F03.A4 MILD DEMENTIA WITH ANXIETY, UNSPECIFIED DEMENTIA TYPE: ICD-10-CM

## 2025-04-08 DIAGNOSIS — Z96.642 HISTORY OF LEFT HIP REPLACEMENT: ICD-10-CM

## 2025-04-08 RX ORDER — DONEPEZIL HYDROCHLORIDE 10 MG/1
10 TABLET, FILM COATED ORAL NIGHTLY
Qty: 30 TABLET | Refills: 2 | Status: SHIPPED | OUTPATIENT
Start: 2025-04-08

## 2025-04-08 NOTE — PATIENT INSTRUCTIONS
Blood work today  Walker with wheels prescription given  Call [facility] make sure they got the order for home health  Aricept 10 mg daily  Keep follow-up appointment with surgery  Follow-up 3 to 6 months

## 2025-04-08 NOTE — PROGRESS NOTES
Dorie Moctezuma is a 79 y.o. female.     History of Present Illness  79-year-old white female smoker with history of hyperlipidemia, hypertension, prediabetes, hypothyroidism, chronic hep C, anxiety, osteoporosis and recent left femur fracture requiring left hip replacement who comes in today post rehab    Blood pressure 152/76 heart rate 60 she denies any chest pain, dyspnea, tachycardia or dizziness    Patient developed post surgical confusion.  She already had no dementia before the fall.  Son-in-law states today that she is back to her normal level of memory and dementia.  I am going to try her on a trial of Aricept to see if that helps with her memory.  We also the option of doing a CT of the head.  They do set up her pills in a pillbox    Patient has been out of rehab a couple days obviously been walking more than normal she is complaining of being sore.  She is using a 4-prong walker  am going to order her a walker with wheels which would be a little safer since she lives alone.  Also recommend family might look into life alert bracelet for her to wear at home in case she has another fall since she lives alone.  [employer] help once supposedly has been ordered from the rehab facility to come in to do therapy with her and she could also use some help with a home health aide for ADLs.  Weight is up 7 pounds at 133 with a BMI of 22.8.  She has had 1 COVID-vaccine is up-to-date on eye exams.  She no longer does mammograms I would like to do a DEXA scan since she does have a diagnosis of osteoporosis but I do not see DEXA scan in the chart.  She no longer does colonoscopies due to her age          Blood work today  Walker with wheels prescription given  [facility] make sure they got the order for home health  Aricept 10 mg daily  Keep follow-up appointment with surgery  Follow-up 3 to 6 months       The following portions of the patient's history were reviewed and updated as appropriate: allergies,  "current medications, past family history, past medical history, past social history, past surgical history, and problem list.    Vitals:    04/08/25 1309   BP: 153/77   BP Location: Right arm   Patient Position: Sitting   Cuff Size: Adult   Pulse: 60   Resp: 16   Temp: 97.1 °F (36.2 °C)   SpO2: 97%   Weight: 60.3 kg (133 lb)   Height: 162.6 cm (64\")       Past Medical History:   Diagnosis Date    Hypertension     Hyperthyroidism      Past Surgical History:   Procedure Laterality Date    CHOLECYSTECTOMY      TOTAL HIP ARTHROPLASTY Left 12/26/2024    Procedure: TOTAL HIP ARTHROPLASTY ANTERIOR WITH HANA TABLE;  Surgeon: Alex Hernandez MD;  Location: [facility] MAIN OR;  Service: Orthopedics;  Laterality: Left;     Family History   Problem Relation Age of Onset    Other Sister         bladder cancer    Heart failure Sister     Crohn's disease Sister     Colon cancer Sister     Colon cancer Brother     No Known Problems Brother      Immunization History   Administered Date(s) Administered    COVID-19 (ADDISON) 03/08/2021    FLUAD TRI 65YR+ 11/14/2006    PEDS-Pneumococcal Conjugate (PCV7) 11/09/2010    Pneumococcal Conjugate 13-Valent (PCV13) 10/08/2019    Pneumococcal Polysaccharide (PPSV23) 10/30/2003    Tdap 08/24/2021    Tetanus Toxoid, Unspecified 06/05/2013       No results displayed because visit has over 200 results.            Review of Systems   Constitutional: Negative.    HENT: Negative.     Respiratory: Negative.     Cardiovascular: Negative.    Gastrointestinal: Negative.    Genitourinary: Negative.    Musculoskeletal:         Left hip pain   Skin: Negative.    Neurological:  Positive for memory problem.   Psychiatric/Behavioral: Negative.         Objective   Physical Exam  Constitutional:       Appearance: Normal appearance.   HENT:      Head: Normocephalic.   Cardiovascular:      Rate and Rhythm: Normal rate and regular rhythm.      Pulses: Normal pulses.      Heart sounds: Normal heart sounds. "   Pulmonary:      Effort: Pulmonary effort is normal.      Breath sounds: Normal breath sounds.   Abdominal:      General: Bowel sounds are normal.   Musculoskeletal:      Comments: Patient unsteady with 4-prong cane hopefully walker will be covered   Skin:     General: Skin is warm.   Neurological:      General: No focal deficit present.      Mental Status: She is alert and oriented to person, place, and time.   Psychiatric:         Mood and Affect: Mood normal.         Behavior: Behavior normal.         Procedures    Assessment & Plan   Diagnoses and all orders for this visit:    1. Smoker (Primary)    2. Hyponatremia    3. Acquired hypothyroidism  -     TSH+Free T4  -     T3    4. Hyperglycemia  -     Comprehensive Metabolic Panel  -     Hemoglobin A1c    5. Preventative health care  -     CBC & Differential    Other orders  -     donepezil (Aricept) 10 MG tablet; Take 1 tablet by mouth Every Night.  Dispense: 30 tablet; Refill: 2           Current Outpatient Medications:     donepezil (Aricept) 10 MG tablet, Take 1 tablet by mouth Every Night., Disp: 30 tablet, Rfl: 2    levothyroxine (SYNTHROID, LEVOTHROID) 50 MCG tablet, Take 1 tablet by mouth once daily, Disp: 90 tablet, Rfl: 0    metoprolol succinate XL (TOPROL-XL) 25 MG 24 hr tablet, Take 1 tablet by mouth once daily, Disp: 90 tablet, Rfl: 0    PARoxetine (PAXIL) 10 MG tablet, TAKE 1 TABLET BY MOUTH ONCE DAILY IN THE MORNING WITH  MID-MORNING  MEDICINE, Disp: 30 tablet, Rfl: 0           JAXSON Padgett 4/8/2025 13:48 EDT  This note has been electronically signed

## 2025-04-09 LAB
ALBUMIN SERPL-MCNC: 4.4 G/DL (ref 3.7–4.7)
ALP SERPL-CCNC: 143 IU/L (ref 44–121)
ALT SERPL-CCNC: 11 IU/L (ref 0–32)
AST SERPL-CCNC: 20 IU/L (ref 0–40)
BASOPHILS # BLD AUTO: 0.1 X10E3/UL (ref 0–0.2)
BASOPHILS NFR BLD AUTO: 1 %
BILIRUB SERPL-MCNC: 0.5 MG/DL (ref 0–1.2)
BUN SERPL-MCNC: 16 MG/DL (ref 8–27)
BUN/CREAT SERPL: 26 (ref 12–28)
CALCIUM SERPL-MCNC: 9.8 MG/DL (ref 8.7–10.3)
CHLORIDE SERPL-SCNC: 91 MMOL/L (ref 96–106)
CO2 SERPL-SCNC: 23 MMOL/L (ref 20–29)
CREAT SERPL-MCNC: 0.61 MG/DL (ref 0.57–1)
EGFRCR SERPLBLD CKD-EPI 2021: 90 ML/MIN/1.73
EOSINOPHIL # BLD AUTO: 0.1 X10E3/UL (ref 0–0.4)
EOSINOPHIL NFR BLD AUTO: 1 %
ERYTHROCYTE [DISTWIDTH] IN BLOOD BY AUTOMATED COUNT: 12.9 % (ref 11.7–15.4)
GLOBULIN SER CALC-MCNC: 3.3 G/DL (ref 1.5–4.5)
GLUCOSE SERPL-MCNC: 94 MG/DL (ref 70–99)
HBA1C MFR BLD: 5.8 % (ref 4.8–5.6)
HCT VFR BLD AUTO: 42.2 % (ref 34–46.6)
HGB BLD-MCNC: 13.9 G/DL (ref 11.1–15.9)
IMM GRANULOCYTES # BLD AUTO: 0 X10E3/UL (ref 0–0.1)
IMM GRANULOCYTES NFR BLD AUTO: 0 %
LYMPHOCYTES # BLD AUTO: 2.9 X10E3/UL (ref 0.7–3.1)
LYMPHOCYTES NFR BLD AUTO: 36 %
MCH RBC QN AUTO: 29.1 PG (ref 26.6–33)
MCHC RBC AUTO-ENTMCNC: 32.9 G/DL (ref 31.5–35.7)
MCV RBC AUTO: 89 FL (ref 79–97)
MONOCYTES # BLD AUTO: 0.5 X10E3/UL (ref 0.1–0.9)
MONOCYTES NFR BLD AUTO: 6 %
NEUTROPHILS # BLD AUTO: 4.4 X10E3/UL (ref 1.4–7)
NEUTROPHILS NFR BLD AUTO: 56 %
PLATELET # BLD AUTO: 313 X10E3/UL (ref 150–450)
POTASSIUM SERPL-SCNC: 4.9 MMOL/L (ref 3.5–5.2)
PROT SERPL-MCNC: 7.7 G/DL (ref 6–8.5)
RBC # BLD AUTO: 4.77 X10E6/UL (ref 3.77–5.28)
SODIUM SERPL-SCNC: 129 MMOL/L (ref 134–144)
T3 SERPL-MCNC: 123 NG/DL (ref 71–180)
T4 FREE SERPL-MCNC: 1.36 NG/DL (ref 0.82–1.77)
TSH SERPL DL<=0.005 MIU/L-ACNC: 3.54 UIU/ML (ref 0.45–4.5)
WBC # BLD AUTO: 8 X10E3/UL (ref 3.4–10.8)

## 2025-04-28 DIAGNOSIS — E03.9 ACQUIRED HYPOTHYROIDISM: ICD-10-CM

## 2025-04-28 DIAGNOSIS — I10 ESSENTIAL HYPERTENSION: ICD-10-CM

## 2025-04-28 RX ORDER — LEVOTHYROXINE SODIUM 50 UG/1
50 TABLET ORAL DAILY
Qty: 90 TABLET | Refills: 0 | Status: SHIPPED | OUTPATIENT
Start: 2025-04-28

## 2025-04-28 RX ORDER — METOPROLOL SUCCINATE 25 MG/1
25 TABLET, EXTENDED RELEASE ORAL DAILY
Qty: 90 TABLET | Refills: 0 | Status: SHIPPED | OUTPATIENT
Start: 2025-04-28

## 2025-04-28 NOTE — TELEPHONE ENCOUNTER
HUB TO RELAY     PATIENT NEEDS AN APPOINTMENT TO SEE DR FERNÁNDEZ. HE HAS NOT SEEN HER SINCE 2023.

## 2025-05-05 ENCOUNTER — PATIENT OUTREACH (OUTPATIENT)
Dept: CASE MANAGEMENT | Facility: OTHER | Age: 80
End: 2025-05-05
Payer: MEDICARE

## 2025-05-05 NOTE — OUTREACH NOTE
AMBULATORY CASE MANAGEMENT NOTE    Names and Relationships of Patient/Support Persons: Contact: Dorie Moctezuma; Relationship: Self -     Outreach completed with patient. ACM educated patient on case management program with patient understanding and decline. ACM provided all contact information for all future needs/inquiries    Cristhian BURLESON  Ambulatory Case Management    5/5/2025, 10:21 EDT

## 2025-05-09 ENCOUNTER — OUTSIDE FACILITY SERVICE (OUTPATIENT)
Dept: FAMILY MEDICINE CLINIC | Facility: CLINIC | Age: 80
End: 2025-05-09
Payer: MEDICARE

## 2025-05-15 ENCOUNTER — OFFICE VISIT (OUTPATIENT)
Dept: FAMILY MEDICINE CLINIC | Facility: CLINIC | Age: 80
End: 2025-05-15
Payer: MEDICARE

## 2025-05-15 VITALS
WEIGHT: 126.4 LBS | RESPIRATION RATE: 18 BRPM | SYSTOLIC BLOOD PRESSURE: 135 MMHG | OXYGEN SATURATION: 93 % | DIASTOLIC BLOOD PRESSURE: 76 MMHG | HEIGHT: 64 IN | BODY MASS INDEX: 21.58 KG/M2 | TEMPERATURE: 97.7 F | HEART RATE: 62 BPM

## 2025-05-15 DIAGNOSIS — R63.0 POOR APPETITE: ICD-10-CM

## 2025-05-15 DIAGNOSIS — R42 DIZZINESS: ICD-10-CM

## 2025-05-15 DIAGNOSIS — F03.A4 MILD DEMENTIA WITH ANXIETY, UNSPECIFIED DEMENTIA TYPE: ICD-10-CM
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
## 2025-05-15 PROCEDURE — 99214 OFFICE O/P EST MOD 30 MIN: CPT | Performed by: NURSE PRACTITIONER

## 2025-05-15 PROCEDURE — G2211 COMPLEX E/M VISIT ADD ON: HCPCS | Performed by: NURSE PRACTITIONER

## 2025-05-15 PROCEDURE — 3078F DIAST BP <80 MM HG: CPT | Performed by: NURSE PRACTITIONER

## 2025-05-15 PROCEDURE — 3075F SYST BP GE 130 - 139MM HG: CPT | Performed by: NURSE PRACTITIONER

## 2025-05-15 NOTE — PROGRESS NOTES
Subjective   Dorie Moctezuma is a 79 y.o. female presents for   Chief Complaint   Patient presents with    Fall    Anorexia       Health Maintenance Due   Topic Date Due    DXA SCAN  Never done    ZOSTER VACCINE (1 of 2) Never done    Hepatitis B (1 of 3 - Risk 3-dose series) Never done    RSV Vaccine - Adults (1 - 1-dose 75+ series) Never done    ANNUAL WELLNESS VISIT  Never done    MAMMOGRAM  07/27/2024    COVID-19 Vaccine (2 - 2024-25 season) 09/01/2024    Pneumococcal Vaccine 50+ (3 of 3 - PCV20 or PCV21) 10/08/2024       History of Present Illness  The patient is an 79-year-old female who presents for evaluation of a fall and loss of appetite.    She experienced a fall yesterday, which was witnessed by her caregiver. She was found sitting on the living room floor, unable to rise, and required assistance to return to her bed. She appeared shaky, possibly due to being startled upon awakening. She was using a cane instead of her walker at the time of the incident. Her cane is missing a stopper, causing it to wobble. She reports no current pain but describes a sensation of emptiness in her head and difficulty with memory recall, a problem persisting for approximately 3 to 4 years. She has been experiencing dizziness upon waking for the past 3 to 4 years. She expresses fear of walking due to the risk of another fall and potential bone fracture. She uses a walker for mobility. She receives home health care twice weekly on Tuesdays and Thursdays.    She has been self-medicating with over-the-counter UTI medication, which has resulted in a change in urine color to yellowish. She reports mild dysuria but no hematuria or back pain. She also reports weakness, which she attributes to her age and previous hip replacement surgery. She occasionally experiences visual disturbances, such as seeing blood on white surfaces.    Her appetite has improved slightly, but she continues to consume a significant amount of chips. She  "has been advised to reduce her salt intake. She enjoys tea and coffee, which increase her urinary frequency. She has been consuming Pedialyte to supplement her sodium intake but has reduced her intake over the past few days. She reports an increase in food intake but has lost some weight. She has been using protein shakes from [employer] but not frequently as she feels her food intake is sufficient. Prior to her hip fracture and femur bone replacement, her food intake was poor, and she relied heavily on protein shakes.    She has discontinued donepezil due to adverse reactions, including nausea. She also stopped taking Pedialyte at the same time but resumed it after discontinuing donepezil. She is currently on Paxil, metoprolol, and Synthroid. She had previously stopped taking Paxil but resumed it after her hip replacement surgery. She has been managing well with her medications since switching to a single weekly planner 2.5 weeks ago. She has not missed any doses since then.    PAST SURGICAL HISTORY:  Hip replacement surgery  Femur bone replacement    SOCIAL HISTORY  She admits to smoking.    Vitals:    05/15/25 1537   BP: 135/76   BP Location: Right arm   Patient Position: Sitting   Cuff Size: Adult   Pulse: 62   Resp: 18   Temp: 97.7 °F (36.5 °C)   TempSrc: Infrared   SpO2: 93%   Weight: 57.3 kg (126 lb 6.4 oz)   Height: 162.6 cm (64\")     Body mass index is 21.7 kg/m².    Current Outpatient Medications on File Prior to Visit   Medication Sig Dispense Refill    levothyroxine (SYNTHROID, LEVOTHROID) 50 MCG tablet Take 1 tablet by mouth once daily 90 tablet 0    metoprolol succinate XL (TOPROL-XL) 25 MG 24 hr tablet Take 1 tablet by mouth once daily 90 tablet 0    PARoxetine (PAXIL) 10 MG tablet TAKE 1 TABLET BY MOUTH ONCE DAILY IN THE MORNING WITH  MID-MORNING  MEDICINE 30 tablet 0    [DISCONTINUED] busPIRone (BUSPAR) 5 MG tablet Take 1 tablet by mouth 3 (Three) Times a Day. 90 tablet 1     No current " facility-administered medications on file prior to visit.       The following portions of the patient's history were reviewed and updated as appropriate: allergies, current medications, past family history, past medical history, past social history, past surgical history, and problem list.    Review of Systems   Constitutional:  Positive for appetite change.        Fall at home   Neurological:  Positive for memory problem.       Objective   Physical Exam  Vitals and nursing note reviewed.   Constitutional:       Appearance: Normal appearance. She is well-developed.   HENT:      Head: Normocephalic and atraumatic.      Right Ear: External ear normal.      Left Ear: External ear normal.      Nose: Nose normal.   Eyes:      Extraocular Movements: Extraocular movements intact.      Pupils: Pupils are equal, round, and reactive to light.   Cardiovascular:      Rate and Rhythm: Normal rate and regular rhythm.      Pulses:           Carotid pulses are 2+ on the right side and 2+ on the left side.     Heart sounds: Normal heart sounds.   Pulmonary:      Effort: Pulmonary effort is normal.      Breath sounds: Normal breath sounds.   Abdominal:      General: Bowel sounds are normal.      Palpations: Abdomen is soft.   Genitourinary:     Vagina: Normal.   Musculoskeletal:         General: Normal range of motion.      Cervical back: Normal range of motion and neck supple.   Skin:     General: Skin is warm and dry.   Neurological:      General: No focal deficit present.      Mental Status: She is alert.      Comments: Oriented to person and place   Psychiatric:         Mood and Affect: Mood normal.         Behavior: Behavior normal.         Judgment: Judgment normal.          Neck: No carotid bruits detected  Respiratory: Clear to auscultation, no wheezing, rales or rhonchi  Skin: No bruising noted    PHQ-9 Total Score:      Results  Labs   - Sodium level: Low    Assessment & Plan   Diagnoses and all orders for this visit:    1.  Fall, initial encounter (Primary)    2. Mild dementia with anxiety, unspecified dementia type    3. Poor appetite    4. Dizziness         1. Fall.  She experienced a fall yesterday but reports no current pain or injuries. There is no evidence of bruising or pain upon examination. She is advised to continue using her walker to prevent further falls.    2. Loss of appetite.  Her appetite has been fluctuating, but she reports eating more recently. It is noted that she has lost some weight. Overconsumption of thyroid medication due to medication confusion in the past week may have temporarily decreased her appetite. She is advised to continue hydrating well and maintain sodium replacement therapy. If her condition does not improve, a re-evaluation of her sodium levels will be necessary. On days when her appetite is diminished, she should consider consuming protein drinks. Her caregiver is also going to implement a pill planner to try to avoid future medication confusion.    3. Urinary tract infection (UTI).  She suspected a UTI and took over-the-counter medication, which turned her urine yellow. She is advised to discontinue the use of over-the-counter UTI medication and only take prescribed medications. If she experiences any urinary symptoms such as burning or frequency, she should provide a urine specimen for testing. She is unable to void today at her appt.     4. Dizziness.  She has been experiencing dizziness, particularly in the mornings, for the last three to four years. An ultrasound of the carotids may be considered to evaluate if clogged carotid arteries are contributing to the dizziness.    5. Medication management.  She is currently taking Paxil, metoprolol, and Synthroid. It is noted that she had previously stopped taking Paxil but resumed it after her hip replacement surgery. She is advised to continue her current medication regimen. It is recommended that she takes her thyroid medication on an empty  stomach first thing in the morning for optimal effectiveness.    There are no Patient Instructions on file for this visit.         Patient or patient representative verbalized consent for the use of Ambient Listening during the visit with  JAXSON Hartmann for chart documentation. 5/19/2025  15:46 EDT

## 2025-05-27 ENCOUNTER — TELEPHONE (OUTPATIENT)
Dept: FAMILY MEDICINE CLINIC | Facility: CLINIC | Age: 80
End: 2025-05-27

## 2025-05-27 ENCOUNTER — TELEPHONE (OUTPATIENT)
Dept: FAMILY MEDICINE CLINIC | Facility: CLINIC | Age: 80
End: 2025-05-27
Payer: MEDICARE

## 2025-05-27 DIAGNOSIS — R35.0 URINARY FREQUENCY: Primary | ICD-10-CM

## 2025-05-27 NOTE — TELEPHONE ENCOUNTER
Caller: PADMINI WITH [employer]    Patient is needing: PADMINI STATES SHE FORGOT TO REQUEST THE PATIENT'S DEMOGRAPHIC SHEET.  SHE IS ALSO NEEDING THAT FAXED TO HER  [phone].

## 2025-05-27 NOTE — TELEPHONE ENCOUNTER
Caller: PADMINI    Relationship: Other    Best call back number:     [phone]       What form or medical record are you requesting:   MED LIST, DIAGNOSIS LIST, PROGRESS NOTE    Who is requesting this form or medical record from you:   [facility] [facility]    How would you like to receive the form or medical records (pick-up, mail, fax): [phone]    Timeframe paperwork needed: ASAP    Additional notes:   PLEASE CALL IF YOU HAVE ANY QUESTIONS.

## 2025-05-27 NOTE — TELEPHONE ENCOUNTER
Caller: Norm Harris    Relationship: Emergency Contact    Best call back number: [phone]    What was the call regarding: PATIENT WAS TOLD THAT IF AFTER THE MEDICINE THAT SHE WAS GIVEN FOR A UTI, IF SHE WAS STILL EXPERIENCING SYMPTOMS IN A WEEK TO GIVE THE OFFICE A CALL TO SET UP URINE TEST.     HUB COULD NOT WARM TRANSFER

## 2025-05-27 NOTE — TELEPHONE ENCOUNTER
Spoke with Norm, he is going to try to get urine sample and bring in, if not he will try to bring her in tomorrow and just do it here.  Can you please put in an order in case it's a day you're not here.

## 2025-05-28 ENCOUNTER — CLINICAL SUPPORT (OUTPATIENT)
Dept: FAMILY MEDICINE CLINIC | Facility: CLINIC | Age: 80
End: 2025-05-28
Payer: MEDICARE

## 2025-05-28 DIAGNOSIS — N30.00 ACUTE CYSTITIS WITHOUT HEMATURIA: ICD-10-CM

## 2025-05-28 DIAGNOSIS — R35.0 URINARY FREQUENCY: Primary | ICD-10-CM

## 2025-05-28 LAB
BILIRUB BLD-MCNC: NEGATIVE MG/DL
CLARITY, POC: CLEAR
COLOR UR: YELLOW
EXPIRATION DATE: ABNORMAL
GLUCOSE UR STRIP-MCNC: NEGATIVE MG/DL
KETONES UR QL: NEGATIVE
LEUKOCYTE EST, POC: ABNORMAL
Lab: ABNORMAL
NITRITE UR-MCNC: NEGATIVE MG/ML
PH UR: 7 [PH] (ref 5–8)
PROT UR STRIP-MCNC: NEGATIVE MG/DL
RBC # UR STRIP: NEGATIVE /UL
SP GR UR: 1.01 (ref 1–1.03)
UROBILINOGEN UR QL: NORMAL

## 2025-05-31 LAB
BACTERIA UR CULT: ABNORMAL
BACTERIA UR CULT: ABNORMAL
OTHER ANTIBIOTIC SUSC ISLT: ABNORMAL

## (undated) DEVICE — UNDERGLV SURG BIOGEL/PI PF SYNTH SURG SZ8.5 BLU 50/BX

## (undated) DEVICE — IRRIGATOR BULB ASEPTO 60CC STRL

## (undated) DEVICE — 450 ML BOTTLE OF 0.05% CHLORHEXIDINE GLUCONATE IN 99.95% STERILE WATER FOR IRRIGATION, USP AND APPLICATOR.: Brand: IRRISEPT ANTIMICROBIAL WOUND LAVAGE

## (undated) DEVICE — BIPOLAR SEALER 23-112-1 AQM 6.0: Brand: AQUAMANTYS™

## (undated) DEVICE — KT SURG TURNOVER 050

## (undated) DEVICE — THE STERILE LIGHT HANDLE COVER IS USED WITH STERIS SURGICAL LIGHTING AND VISUALIZATION SYSTEMS.

## (undated) DEVICE — ANTIBACTERIAL UNDYED BRAIDED (POLYGLACTIN 910), SYNTHETIC ABSORBABLE SUTURE: Brand: COATED VICRYL

## (undated) DEVICE — NEEDLE, QUINCKE, 20GX3.5": Brand: MEDLINE

## (undated) DEVICE — C-ARM: Brand: UNBRANDED

## (undated) DEVICE — PK TOTL HIP 50

## (undated) DEVICE — SHEET,DRAPE,53X77,STERILE: Brand: MEDLINE

## (undated) DEVICE — SOL ISO/ALC RUB 91PCT 16OZ

## (undated) DEVICE — APPL CHLORAPREP HI/LITE 26ML ORNG

## (undated) DEVICE — PICO 7 10CM X 20CM: Brand: PICO™ 7

## (undated) DEVICE — SYS IRR SURGIPHOR A/MIC RTU BO PVPI 450ML STRL

## (undated) DEVICE — SYR LUERLOK 30CC

## (undated) DEVICE — DUAL CUT SAGITTAL BLADE

## (undated) DEVICE — DRAPE,U/ SHT,SPLIT,PLAS,STERIL: Brand: MEDLINE

## (undated) DEVICE — PENCL SMOKE/EVAC MEGADYNE TELESCP 15FT

## (undated) DEVICE — THE STERILE CAMERA HANDLE COVER IS FOR USE WITH THE STERIS SURGICAL LIGHTING AND VISUALIZATION SYSTEMS.

## (undated) DEVICE — KT PT POSITION SUPINE HANA/PROFX TABL

## (undated) DEVICE — RECIPROCATING BLADE HEAVY DUTY LONG, OFFSET  (77.6 X 0.77 X 11.2MM)

## (undated) DEVICE — 6617 IOBAN II PATIENT ISOLATION DRAPE 5/BX,4BX/CS: Brand: STERI-DRAPE™ IOBAN™ 2

## (undated) DEVICE — BNDG ELAS CO-FLEX SLF ADHR 4IN5YD LF STRL

## (undated) DEVICE — GLV SURG SENSICARE PI ORTHO SZ8.5 LF STRL